# Patient Record
Sex: MALE | Race: BLACK OR AFRICAN AMERICAN | Employment: UNEMPLOYED | URBAN - METROPOLITAN AREA
[De-identification: names, ages, dates, MRNs, and addresses within clinical notes are randomized per-mention and may not be internally consistent; named-entity substitution may affect disease eponyms.]

---

## 2021-07-14 LAB
HBA1C MFR BLD HPLC: 5.1 %
LDL-C, EXTERNAL: 58
TOTAL CHOLESTEROL, NCHOLT: 138

## 2022-06-23 ENCOUNTER — HOSPITAL ENCOUNTER (OUTPATIENT)
Dept: PHYSICAL THERAPY | Age: 57
Discharge: HOME | End: 2022-06-23
Payer: COMMERCIAL

## 2022-06-23 PROCEDURE — 97162 PT EVAL MOD COMPLEX 30 MIN: CPT

## 2022-06-23 NOTE — PROGRESS NOTES
[address] [address] [facility] [address] [facility], [address] [place] [address]  Ph: [phone]    Fax: [phone]    Initial Evaluation/Plan of Care/Statement of Necessity for Physical Therapy Services     Patient name: Alannah Rincon    Date/Start of Care:2022  : 1965  [x]  Patient  Verified  Provider#: [id number]          Referral source: Clemente Callejas MD    Return visit to MD: 22   Medical/Treatment Diagnosis: Left shoulder pain [M25.512]  Fracture of left shoulder girdle, part unspecified, subsequent encounter for fracture with routine healing [S42.92XD]  Encounter for other orthopedic aftercare [Z47.89]    Payor: [address] [place], [place] [address] / Plan: [place] / Product Type: Managed Care Medicaid /       Prior Hospitalization: see medical history     Comorbidities: see intake summary   Prior Level of Function: Indep with ADLs/IADLs   Medications: Verified on Patient Summary List          Patient / Family readiness to learn indicated by: asking questions, trying to perform skills and interest  Persons(s) to be included in education: patient (P); fiance   Barriers to Learning/Limitations: None  Patient Self Reported Health Status: fair  Rehabilitation Potential: good  Previous Treatment/Compliance: none   PMHx/Surgical Hx: see intake summary   Work Hx: not working   Living Situation: Lives with ngoc in a one story home, [address] with railing   Barriers to progress: none   Motivation: good   Substance use: smoker   Cognition: A & O x 3 (unsure of details surrounding injury)   Onset Date: pt states 22 but fiance states it was end of April. Script indicates 22  SUBJECTIVE  Mechanism of Injury/History of Complaint: s/p L shoulder fx following a seizure that occurred in the middle of the night. Pt states all he remembers is waking up in the hospital.  He ended up having surgery on the L shoulder.   Referral states proximal humerus fracture repair. Pt states he was initially in a sling but not anymore. Pt reporting constant pain. Difficulty with showering and dressing due difficulty with lifting the arm up. Also feels like the L hand is weak.    - pt is R hand dominant   Area of pain:  Throughout the upper arm from elbow to top of shoulder    Pain Descriptors:  Aching, throbbing, sharp pain    Pain Level (0-10 scale)  At rest: 4 With activity: 10  Worst: 10   Least: 4  Things that worsen pain: lifting the arm up, using the L arm   Things that ease pain: sleeping  Objective/Functional Measures including ROM/MMT:   Physical Findings   Ortho:   Posture:  Rounded shoulders, scapula winging (L>R); slumped sitting posture   Palpation: TTP throughout the L UT, andres-scapula muscles, distal end of scar   Gross findings:  Large healed scar anterior shoulder with + scar tissue     Specific joints: *normal values in ()    SHOULDER                                         AROM   PROM            MMT   R L R L R L   Flexion (180) 161   111     Extension (60)         Abduction (180) 142        Adduction (0)         IR (70) 68   45     ER (90) 90   15     Horizontal Abduction (130)         Horizontal Adduction (45)         Additional comments: ER @ 60 deg abd; AROM and MMT not tested     ELBOW                             AROM                             PROM                             MMT   R L R L R L   Flexion (150)  WNL       Extension (0)  WNL       Additional comments: wrist flexion and ext 4+/5       SPINE:   CERVICAL                                                ROM                                  Strength   Flexion  WNL    Extension WNL    Protraction     Retraction        R L R L   Lateral Flexion (45) 32 15     Rotation (90) WNL WNL     Additional comments: pulling pain with R lat flexion    Strength: R 54 lbs, L23 lbs     Special Tests: none  Ampa Score:   39.66%  ASSESSMENT/Changes in Function:   Pt referred to PT services s/p L shoulder sx on 4/30/22. Pt presents with constant L shoulder pain, decrease L shoulder ROM and strength and decrease activity tolerance. Pt will benefit from skilled PT services to assist in pain reduction and help maximize his L UE functional mobility. Problem List/Impairments: pain affecting function, decrease ROM, decrease strength, decrease activity tolerance and decrease flexibility/ joint mobility  Treatment Plan may include any combination of the following: Therapeutic exercise, Therapeutic activities, Neuromuscular re-education, Physical agent/modality, Manual therapy, Patient education and Functional mobility training  Patient/ Caregiver education and instruction: activity modification and exercises  Frequency / Duration: Patient to be seen 2 times per week for 16-24 treatments. Certification Period: 6/23/22 - 9/23/22  Patient Goal (s): full motion in my arm.     [] Met [] Not met [] Partially met   Short Term Goals: To be accomplished in 8-12 treatments. 1.  Indep with a HEP to assist in rehab progression. [] Met [] Not met [] Partially met    2. L shoulder PROM flexion >150 deg for functional L shoulder ROM. [] Met [] Not met [] Partially met    3. L shoulder PROM ABD >120 deg for functional L shoulder ROM. [] Met [] Not met [] Partially met    4. L shoulder PROM ER >60 deg for functional L shoulder ROM. [] Met [] Not met [] Partially met    5. Max L shoulder pain 5/10 for increase activity tolerance. [] Met [] Not met [] Partially met    6. 10-20 pound improvement in L  strength. [] Met [] Not met [] Partially met    Long Term Goals: To be accomplished in 16-24 treatments. 1.  Pt will be able to reach overhead >90 deg with good shoulder mechanics without increase pain. [] Met [] Not met [] Partially met    2. Pt will be able to reach behind his back with minimal difficulty to improve indep with dressing and showering. [] Met [] Not met [] Partially met    3.  Pt will be able to lift 5 lbs in the L hand without difficulty or pain. [] Met [] Not met [] Partially met    4. 10-20% improvement in AM-PAC score as indication of improved functional mobility in the L UE. [] Met [] Not met [] Partially met    TODAY'S TREATMENT: EVALUATION completed   Visit #: 1  In time: 1:50pm Out time: 2:40pm  Total Treatment Time (min): 50   Pain Level (0-10 scale) pre treatment: 4  Pain Level (0-10 scale) post treatment: 2  With   [x] TE   [] TA   [] Neuro   [] SC   [] other: Patient Education: [x] Review HEP  Handout provided   [] Progressed/Changed HEP based on:   [] positioning   [] body mechanics   [] transfers   [x] heat/ice application    [x] other: discussed POC        [x]  Plan of care has been reviewed with PTA. The Plan of Care is based on information from the initial evaluation. Yi Barkley, PT, DPT    6/23/2022   ________________________________________________________________________    I certify that the above Therapy Services are being furnished while the patient is under my care. I agree with the treatment plan and certify that this therapy is necessary.     Physician's Signature:_________________________________________________  Date:____________Time: ____________     Azra Briones MD

## 2022-07-13 ENCOUNTER — HOSPITAL ENCOUNTER (OUTPATIENT)
Dept: PHYSICAL THERAPY | Age: 57
Discharge: HOME | End: 2022-07-13
Payer: COMMERCIAL

## 2022-07-13 PROCEDURE — 97110 THERAPEUTIC EXERCISES: CPT

## 2022-07-13 NOTE — PROGRESS NOTES
PT DAILY TREATMENT NOTE  [facility]     Patient Name: Dakota Gregg  Date:2022  : 1965  [x]  Patient  Verified  Payor: [address] [place] [address] Road / Plan: [address] / Product Type: Managed Care Medicaid /    Treatment Area: Left shoulder pain [M25.512]  Fracture of left shoulder girdle, part unspecified, subsequent encounter for fracture with routine healing [S42.92XD]  Encounter for other orthopedic aftercare [Z47.89]       Next MD APPT:   In time:01:45 pm  Out time:02:30 pm  Total Treatment Time (min): 45  Visit #:     SUBJECTIVE  Pain Level (0-10 scale) pre treatment: 3/10  Pain Level (0-10 scale) post treatment: 1-2/10    Any medication changes, allergies to medications, adverse drug reactions, diagnosis change, or new procedure performed?:   [x] No    [] Yes (see summary sheet for update)    Subjective functional status/changes:   [] No changes reported  Patient stated he can tell the difference since doing the exercises. OBJECTIVE      45 min Therapeutic Exercise:  [] See flow sheet :   Rationale: increase ROM and increase strength to improve the patients ability to improve functional mobility in the L UE without pain. min Manual Therapy:    Rationale: decrease pain, increase ROM, decrease trigger points and increase postural awareness to improve the patients ability to  improve functional mobility in the L UE without pain. With   [x] TE   [] TA   [] Neuro   [] SC   [] other: Patient Education: [x] Review HEP    [] Progressed/Changed HEP based on:   [] positioning   [] body mechanics   [] transfers   [] Use of heat/ice     [x] other:         Other Objective/Functional Measures: initiated exercises   Added bike, pulleys , wheel and wand exercises  Reviewed table stretches for HEP    ASSESSMENT/Changes in Function:   Patient tolerated exercises well with no increase in pain or discomfort. He required min cues with exercises and is compliant with HEP.  Patient very tight in ER range . Decrease in pain following treatment session today. Patient will continue to benefit from skilled PT services to address ROM deficits, address strength deficits and analyze and address soft tissue restrictions to attain remaining goals. GOALS/Progress towards goals:  Patient Goal (s): full motion in my arm.     []? Met []? Not met []? Partially met   Short Term Goals: To be accomplished in 8-12 treatments. 1.  Indep with a HEP to assist in rehab progression. [x]? Met []? Not met []? Partially met  7/13  2. L shoulder PROM flexion >150 deg for functional L shoulder ROM. []? Met []? Not met []? Partially met    3. L shoulder PROM ABD >120 deg for functional L shoulder ROM. []? Met []? Not met []? Partially met    4. L shoulder PROM ER >60 deg for functional L shoulder ROM. []? Met []? Not met []? Partially met    5. Max L shoulder pain 5/10 for increase activity tolerance. []? Met []? Not met []? Partially met    6. 10-20 pound improvement in L  strength. []? Met []? Not met []? Partially met      Long Term Goals: To be accomplished in 16-24 treatments. 1.  Pt will be able to reach overhead >90 deg with good shoulder mechanics without increase pain. []? Met []? Not met []? Partially met    2. Pt will be able to reach behind his back with minimal difficulty to improve indep with dressing and showering. []? Met []? Not met []? Partially met    3. Pt will be able to lift 5 lbs in the L hand without difficulty or pain. []? Met []? Not met []? Partially met    4. 10-20% improvement in AM-PAC score as indication of improved functional mobility in the L UE. []? Met []? Not met []?  Partially met    Treatment Plan may include any combination of the following: Therapeutic exercise, Therapeutic activities, Neuromuscular re-education, Physical agent/modality, Manual therapy, Patient education and Functional mobility training    Frequency / Duration: Patient to be seen 2 times per week for 16-24 treatments.     Certification Period: 6/23/22 - 9/23/22        PLAN  [x]  Continue plan of care  [x]  Upgrade activities as tolerated      [x]  Update interventions per flow sheet       []  Discharge due to:  []  Other:    Nazario Webb PTA 7/13/2022

## 2022-07-22 ENCOUNTER — HOSPITAL ENCOUNTER (EMERGENCY)
Age: 57
Discharge: STILL ADMITTED OR ADMINISTRATIVE | End: 2022-07-22

## 2022-07-22 ENCOUNTER — TRANSCRIBE ORDER (OUTPATIENT)
Dept: SCHEDULING | Age: 57
End: 2022-07-22

## 2022-07-22 VITALS
DIASTOLIC BLOOD PRESSURE: 89 MMHG | HEIGHT: 71 IN | WEIGHT: 150 LBS | BODY MASS INDEX: 21 KG/M2 | HEART RATE: 129 BPM | TEMPERATURE: 98.5 F | OXYGEN SATURATION: 100 % | SYSTOLIC BLOOD PRESSURE: 156 MMHG | RESPIRATION RATE: 18 BRPM

## 2022-07-22 DIAGNOSIS — S42.309A: Primary | ICD-10-CM

## 2022-07-22 NOTE — ED TRIAGE NOTES
Pt was sent by orthopedics for CT scan. Has order for CT upper extremity. States he injured L shoulder d/t increased exercise. Recent fracture in shoulder in May.

## 2022-07-22 NOTE — Clinical Note
Patient left without being seen after triage. Patient was called for a room three times with no response. - Pt came to get an ordered CT scan and had no appointment. His order was for [facility] and their CT was down. They went to [facility] ED and they  were swamped with patients so they then came here. Unfortunately, they were signed up for an ED visit. Upon further investigation, the patient did not want an ED visit. Taapn Weller was evaluating the patient when this came to light. She would li ke the visit to not be charged. She contacted central scheduling and was unable to get the appointment changed to here and scheduling is going to attempt to expeditite the appointment and call the patient with the appointment time.

## 2022-07-28 ENCOUNTER — HOSPITAL ENCOUNTER (EMERGENCY)
Age: 57
Discharge: STILL ADMITTED OR ADMINISTRATIVE | End: 2022-07-28

## 2022-10-12 NOTE — PROGRESS NOTES
[address] [facility]  [facility] [address] [facility], [address] [place] [address]  Ph: [phone]  Fax: [phone]    Medicaid Discharge Summary 215    Patient name: Bella Woodall  : 1965  Provider#: [id number]  Referral source: Mckenzie Major MD      Medical/Treatment Diagnosis: Left shoulder pain [M25.512]  Fracture of left shoulder girdle, part unspecified, subsequent encounter for fracture with routine healing [S42.92XD]  Encounter for other orthopedic aftercare [Z47.89]     Prior Hospitalization: see medical history     Comorbidities: See Plan of Care  Prior Level of Function: See Plan of Care  Medications: Verified on Patient Summary List  Start of Care: 22   Onset Date: (see initial plan of care)  Visits from Start of Care: 2  Missed Visits: -  Certification Period : 22 to 22  Assessment/Summary of care/GOALS:   Patient did not return for follow up visits or respond to our calls. The last treatment was on 22, and so the patient will be discharged at this time. Goals were not re-assessed but pt was reporting minimal L shoulder pain at that time. Thank you for the referral.   Ampac Score:  NA    RECOMMENDATIONS:  [x]Discontinue therapy:   []Patient has reached or is progressing toward set goals and is independent with HEP   [x]Patient is non-compliant or has abdicated   []Due to lack of appreciable progress towards set goals   []Patient was hospitalized or suffered illness that impacted ability to continue therapy   []Other:  Arlene Bonds, PT, DPT 10/12/2022   Retain this original for your records. If you are unable to process this request in 24 hours, please contact our office.   ________________________________________________________________________  NOTE TO PHYSICIAN:  Please complete the following and FAX to:   [address] [facility]:  Fax: [phone]   ____ I have read the above report and request that my patient be discharged from therapy.     Physician's Signature:_____________________________________________________ Date:_____________Time:_____________     Erika Velasco MD

## 2022-10-29 PROBLEM — F19.10 POLYSUBSTANCE ABUSE (HCC): Status: ACTIVE | Noted: 2022-06-02

## 2022-10-29 PROBLEM — G40.909 SEIZURE DISORDER (HCC): Status: ACTIVE | Noted: 2022-06-02

## 2022-10-29 PROBLEM — S42.92XD: Status: ACTIVE | Noted: 2022-06-02

## 2022-10-29 PROBLEM — F10.10 ALCOHOL ABUSE: Status: ACTIVE | Noted: 2022-06-02

## 2022-10-29 RX ORDER — BISMUTH SUBSALICYLATE 262 MG
1 TABLET,CHEWABLE ORAL DAILY
COMMUNITY
Start: 2022-10-23 | End: 2022-11-16 | Stop reason: SDUPTHER

## 2022-10-29 RX ORDER — LEVETIRACETAM 1000 MG/1
1000 TABLET ORAL 2 TIMES DAILY
COMMUNITY
Start: 2022-09-12

## 2022-10-29 RX ORDER — LABETALOL 200 MG/1
200 TABLET, FILM COATED ORAL 2 TIMES DAILY
COMMUNITY
Start: 2022-10-23 | End: 2022-11-16 | Stop reason: SDUPTHER

## 2022-10-29 RX ORDER — PRAMIPEXOLE DIHYDROCHLORIDE 0.5 MG/1
1 TABLET ORAL
COMMUNITY
Start: 2022-10-23

## 2022-10-29 RX ORDER — DULOXETIN HYDROCHLORIDE 30 MG/1
1 CAPSULE, DELAYED RELEASE ORAL
COMMUNITY
Start: 2022-10-23 | End: 2022-11-16 | Stop reason: SDUPTHER

## 2022-10-29 RX ORDER — FOLIC ACID 1 MG/1
1 TABLET ORAL DAILY
COMMUNITY
Start: 2022-10-23 | End: 2022-11-16 | Stop reason: SDUPTHER

## 2022-11-14 ENCOUNTER — TRANSCRIBE ORDER (OUTPATIENT)
Dept: EMERGENCY DEPT | Age: 57
End: 2022-11-14

## 2022-11-14 ENCOUNTER — HOSPITAL ENCOUNTER (OUTPATIENT)
Dept: CT IMAGING | Age: 57
Discharge: HOME | End: 2022-11-14
Payer: COMMERCIAL

## 2022-11-14 DIAGNOSIS — R10.84 ABDOMINAL PAIN, GENERALIZED: ICD-10-CM

## 2022-11-14 DIAGNOSIS — R10.84 ABDOMINAL PAIN, GENERALIZED: Primary | ICD-10-CM

## 2022-11-14 PROCEDURE — 74176 CT ABD & PELVIS W/O CONTRAST: CPT

## 2022-11-16 ENCOUNTER — OFFICE VISIT (OUTPATIENT)
Dept: INTERNAL MEDICINE CLINIC | Age: 57
End: 2022-11-16
Payer: COMMERCIAL

## 2022-11-16 VITALS
HEIGHT: 71 IN | OXYGEN SATURATION: 98 % | TEMPERATURE: 96.9 F | BODY MASS INDEX: 20.64 KG/M2 | RESPIRATION RATE: 18 BRPM | WEIGHT: 147.4 LBS | SYSTOLIC BLOOD PRESSURE: 134 MMHG | DIASTOLIC BLOOD PRESSURE: 80 MMHG | HEART RATE: 87 BPM

## 2022-11-16 DIAGNOSIS — Z12.11 COLON CANCER SCREENING: ICD-10-CM

## 2022-11-16 DIAGNOSIS — K86.81 EXOCRINE PANCREATIC INSUFFICIENCY: Primary | ICD-10-CM

## 2022-11-16 DIAGNOSIS — I10 PRIMARY HYPERTENSION: ICD-10-CM

## 2022-11-16 DIAGNOSIS — R22.32 LOCALIZED SWELLING ON LEFT HAND: ICD-10-CM

## 2022-11-16 DIAGNOSIS — S42.92XD SHOULDER FRACTURE, LEFT, WITH ROUTINE HEALING, SUBSEQUENT ENCOUNTER: ICD-10-CM

## 2022-11-16 DIAGNOSIS — Z78.9 ALCOHOL USE: ICD-10-CM

## 2022-11-16 DIAGNOSIS — M48.00 CENTRAL STENOSIS OF SPINAL CANAL: ICD-10-CM

## 2022-11-16 DIAGNOSIS — G40.909 SEIZURE DISORDER (HCC): ICD-10-CM

## 2022-11-16 PROCEDURE — 3079F DIAST BP 80-89 MM HG: CPT | Performed by: INTERNAL MEDICINE

## 2022-11-16 PROCEDURE — 99204 OFFICE O/P NEW MOD 45 MIN: CPT | Performed by: INTERNAL MEDICINE

## 2022-11-16 PROCEDURE — 3075F SYST BP GE 130 - 139MM HG: CPT | Performed by: INTERNAL MEDICINE

## 2022-11-16 RX ORDER — FOLIC ACID 1 MG/1
1 TABLET ORAL DAILY
Qty: 90 TABLET | Refills: 1 | Status: SHIPPED | OUTPATIENT
Start: 2022-11-16

## 2022-11-16 RX ORDER — BISMUTH SUBSALICYLATE 262 MG
1 TABLET,CHEWABLE ORAL DAILY
Qty: 90 TABLET | Refills: 1 | Status: SHIPPED | OUTPATIENT
Start: 2022-11-16

## 2022-11-16 RX ORDER — PANCRELIPASE 60000; 12000; 38000 [USP'U]/1; [USP'U]/1; [USP'U]/1
1 CAPSULE, DELAYED RELEASE PELLETS ORAL
Qty: 90 CAPSULE | Refills: 3 | Status: SHIPPED | OUTPATIENT
Start: 2022-11-16

## 2022-11-16 RX ORDER — LABETALOL 200 MG/1
200 TABLET, FILM COATED ORAL 2 TIMES DAILY
Qty: 90 TABLET | Refills: 1 | Status: SHIPPED | OUTPATIENT
Start: 2022-11-16

## 2022-11-16 RX ORDER — DULOXETIN HYDROCHLORIDE 30 MG/1
30 CAPSULE, DELAYED RELEASE ORAL
Qty: 90 CAPSULE | Refills: 1 | Status: SHIPPED | OUTPATIENT
Start: 2022-11-16

## 2022-11-16 NOTE — PROGRESS NOTES
[address] [facility]  [address]  [place], [address] [facility]  Phone: [phone]      Ayla Cruz (: 1965) is a 56 y.o. male, established patient, here for evaluation of the following chief complaint(s):  Follow Up Chronic Condition, Seizure, Labs, and Follow-up (Follow-up of CT)         SUBJECTIVE/OBJECTIVE:  HPI:  Pt is here to follow up of stool test and CT abdomen. and seizures. Patient stated he did a stool test done at [facility] and he had a CT abdomen at [facility]. He stated his diarrhea comes and goes. Patient stated he never had a colonoscopy. Patient is with his Edwin Renteria and she stated he had 2 seizures last month. Patient stated he has left hand swelling when he hit his hand during his seizure. He stated he is taking seizure medicine. Telicia  informed that he is drinking alcohol every day. Patient stated he has follow-up with Dr Karen Johnson  tomorrow for the left shoulder fracture. He stated pain is better and can lift the left arm up to the shoulder. Patient stated he needs refill for for his blood pressure medication and vitamins. Prior to Admission medications    Medication Sig Start Date End Date Taking? Authorizing Provider   lipase-protease-amylase (Creon) 12,000-38,000 -60,000 unit capsule Take 1 Capsule by mouth three (3) times daily (with meals). 22  Yes Fany Mcfarlane MD   multivitamin (ONE A DAY) tablet Take 1 Tablet by mouth daily. 22  Yes Fany Mcfarlane MD   labetaloL (NORMODYNE) 200 mg tablet Take 1 Tablet by mouth two (2) times a day. 22  Yes Fany Mcfarlane MD   folic acid (FOLVITE) 1 mg tablet Take 1 Tablet by mouth daily. 22  Yes Fany Mcfarlaen MD   DULoxetine (CYMBALTA) 30 mg capsule Take 1 Capsule by mouth nightly. 22  Yes Fany Mcfarlane MD   levETIRAcetam 1,000 mg tablet Take 1,000 mg by mouth two (2) times a day.  22  Yes Provider, Historical   pramipexole (MIRAPEX) 0.5 mg tablet Take 1 Tablet by mouth nightly. 90 minutes before bed 10/23/22  Yes Provider, Historical        No Known Allergies     Past Medical History:   Diagnosis Date    Alcohol abuse     COPD (chronic obstructive pulmonary disease) ([facility])     Fracture of bone of left shoulder     Polysubstance abuse ([facility])     Seizures ([facility])         Family History   Problem Relation Age of Onset    Emphysema Mother         Past Surgical History:   Procedure Laterality Date    HX ORTHOPAEDIC         Review of Systems   Constitutional:  Negative for chills and fever. HENT:  Negative for congestion, ear pain, nosebleeds, sinus pain, sore throat and tinnitus. Eyes:  Negative for redness. Respiratory:  Negative for cough and shortness of breath. Cardiovascular:  Negative for chest pain and palpitations. Gastrointestinal:  Positive for diarrhea. Negative for abdominal pain, nausea and vomiting. Endocrine: Negative for cold intolerance and polyuria. Genitourinary:  Negative for dysuria and hematuria. Musculoskeletal:  Negative for back pain and neck pain. Skin:  Negative for rash. Neurological:  Positive for seizures. Negative for dizziness and headaches. Psychiatric/Behavioral: Negative. /80 (BP 1 Location: Left upper arm, BP Patient Position: Sitting, BP Cuff Size: Adult)   Pulse 87   Temp 96.9 °F (36.1 °C) (Temporal)   Resp 18   Ht 5' 11\" (1.803 m)   Wt 147 lb 6.4 oz (66.9 kg)   SpO2 98%   BMI 20.56 kg/m²      Physical Exam  Constitutional:       General: He is not in acute distress. Appearance: Normal appearance. Comments: Patient is with his fiancée Telicia   HENT:      Head: Normocephalic and atraumatic. Right Ear: External ear normal.      Left Ear: External ear normal.      Nose: Nose normal.      Mouth/Throat:      Mouth: Mucous membranes are moist.   Eyes:      Extraocular Movements: Extraocular movements intact. Pupils: Pupils are equal, round, and reactive to light. Cardiovascular:      Rate and Rhythm: Normal rate and regular rhythm. Pulmonary:      Effort: Pulmonary effort is normal.      Breath sounds: Normal breath sounds. Abdominal:      General: Bowel sounds are normal.      Palpations: Abdomen is soft. Tenderness: There is no abdominal tenderness. Musculoskeletal:         General: Normal range of motion. Cervical back: Neck supple. Comments: Swelling of the dorsum of the left hand, he can lift to the left arm up to the shoulder   Skin:     General: Skin is warm and dry. Neurological:      General: No focal deficit present. Mental Status: He is alert and oriented to person, place, and time. Psychiatric:         Mood and Affect: Mood normal.       ASSESSMENT/PLAN:  Below is the assessment and plan developed based on review of pertinent history, physical exam, labs, studies, and medications. 1. Exocrine pancreatic insufficiency  Comments:  Stool H. pylori is negative, pancreatic elastase is less than 50. Will start Creon with meals  Orders:  -     TSH 3RD GENERATION  -     LIPID PANEL  -     METABOLIC PANEL, COMPREHENSIVE  -     CBC WITH AUTOMATED DIFF  2. Seizure disorder ([facility] [address]  Comments:  Patient is on Keppra and he stated he is taking Keppra daily. Most probably his seizure is related to his alcohol use. Advised to avoid alcohol and to follow-  Orders:  -     REFERRAL TO NEUROLOGY  3. Central stenosis of spinal canal  Comments:  CT abdomen reviewed with the patient has some moderate to severe spinal canal stenosis, will get consult with , advised to continue Cymbalta  Orders:  -     REFERRAL TO SPINE SURGERY  -     DULoxetine (CYMBALTA) 30 mg capsule; Take 1 Capsule by mouth nightly., Normal, Disp-90 Capsule, R-1  4. Alcohol use  Comments:  Counseled the patient about alcohol use. Orders:  -     LIPID PANEL  -     METABOLIC PANEL, COMPREHENSIVE  -     CBC WITH AUTOMATED DIFF  -     folic acid (FOLVITE) 1 mg tablet;  Take 1 Tablet by mouth daily. , Normal, Disp-90 Tablet, R-1  5. Shoulder fracture, left, with routine healing, subsequent encounter  Comments: On supportive treatment, advised to keep  follow-up with Dr. Macel Lefort  6. Localized swelling on left hand  Comments:  Patient had a fall with his recent seizure, has appointment with Dr. Macel Lefort  tomorrow  7. Primary hypertension  Comments:  Blood pressure is controlled advised to continue current medications and low-sodium diet  Orders:  -     labetaloL (NORMODYNE) 200 mg tablet; Take 1 Tablet by mouth two (2) times a day., Normal, Disp-90 Tablet, R-1  8. Colon cancer screening  Comments: Will arrange colonoscopy with Dr. Milton Crawley and information given to patient  Orders:  -     REFERRAL TO GASTROENTEROLOGY    Return in about 4 months (around 3/16/2023), or if symptoms worsen or fail to improve. There are no Patient Instructions on file for this visit. Health Maintenance Due   Topic Date Due    Hepatitis C Screening  Never done    Depression Screen  Never done    Pneumococcal 0-64 years (1 - PCV) Never done    DTaP/Tdap/Td series (1 - Tdap) Never done    Colorectal Cancer Screening Combo  Never done    Shingrix Vaccine Age 50> (1 of 2) Never done    COVID-19 Vaccine (3 - Booster for Moderna series) 07/06/2021    Flu Vaccine (1) Never done        Aspects of this note may have been generated using voice recognition software. Despite editing, there may be unrecognized errors. An electronic signature was used to authenticate this note. -- Travon Robledo MD       Visit Vitals  /80 (BP 1 Location: Left upper arm, BP Patient Position: Sitting, BP Cuff Size: Adult)   Pulse 87   Temp 96.9 °F (36.1 °C) (Temporal)   Resp 18   Ht 5' 11\" (1.803 m)   Wt 147 lb 6.4 oz (66.9 kg)   SpO2 98%   BMI 20.56 kg/m²     1. \"Have you been to the ER, urgent care clinic since your last visit? Hospitalized since your last visit? \" No    2.  \"Have you seen or consulted any other health care providers outside of the [address] [place] [address] since your last visit? \" Yes When: August 2022  Where: Dr. Perry Cheung Reason for visit: follow up seizures and neuropathy       Orthopedic surgeon was last seen 3 weeks ago- Dr. Bi Miller    3. For patients aged 39-70: Has the patient had a colonoscopy / FIT/ Cologuard? No      If the patient is female:    4. For patients aged 41-77: Has the patient had a mammogram within the past 2 years? NA - based on age or sex      11. For patients aged 21-65: Has the patient had a pap smear?  NA - based on age or sex

## 2023-01-23 ENCOUNTER — TELEPHONE (OUTPATIENT)
Dept: INTERNAL MEDICINE CLINIC | Age: 58
End: 2023-01-23

## 2023-01-23 NOTE — TELEPHONE ENCOUNTER
----- Message from Jared Hoff MD sent at 1/22/2023 12:40 PM EST -----  Let him know potasium is slightly high at 5.3,to avoid oranges,bananas and try to have a bowel movement every day

## 2023-02-02 ENCOUNTER — OFFICE VISIT (OUTPATIENT)
Dept: INTERNAL MEDICINE CLINIC | Age: 58
End: 2023-02-02
Payer: COMMERCIAL

## 2023-02-02 VITALS
SYSTOLIC BLOOD PRESSURE: 120 MMHG | TEMPERATURE: 97.1 F | WEIGHT: 143 LBS | HEART RATE: 111 BPM | DIASTOLIC BLOOD PRESSURE: 100 MMHG | BODY MASS INDEX: 20.02 KG/M2 | OXYGEN SATURATION: 99 % | HEIGHT: 71 IN

## 2023-02-02 DIAGNOSIS — G40.909 SEIZURE DISORDER (HCC): ICD-10-CM

## 2023-02-02 DIAGNOSIS — J06.9 ACUTE URI: ICD-10-CM

## 2023-02-02 DIAGNOSIS — K86.81 EXOCRINE PANCREATIC INSUFFICIENCY: ICD-10-CM

## 2023-02-02 DIAGNOSIS — D64.9 MILD ANEMIA: Primary | ICD-10-CM

## 2023-02-02 DIAGNOSIS — R10.9 LEFT FLANK PAIN: ICD-10-CM

## 2023-02-02 DIAGNOSIS — I10 PRIMARY HYPERTENSION: ICD-10-CM

## 2023-02-02 DIAGNOSIS — F17.200 TOBACCO USE DISORDER: ICD-10-CM

## 2023-02-02 DIAGNOSIS — M48.00 CENTRAL STENOSIS OF SPINAL CANAL: ICD-10-CM

## 2023-02-02 DIAGNOSIS — B35.1 ONYCHOMYCOSIS: ICD-10-CM

## 2023-02-02 DIAGNOSIS — D72.821 MONOCYTOSIS: ICD-10-CM

## 2023-02-02 DIAGNOSIS — E87.5 HYPERKALEMIA: ICD-10-CM

## 2023-02-02 DIAGNOSIS — E87.1 HYPONATREMIA: ICD-10-CM

## 2023-02-02 PROBLEM — S42.352D CLOSED DISPLACED COMMINUTED FRACTURE OF SHAFT OF LEFT HUMERUS WITH ROUTINE HEALING: Status: ACTIVE | Noted: 2022-07-31

## 2023-02-02 PROBLEM — M83.5: Status: ACTIVE | Noted: 2022-11-17

## 2023-02-02 PROBLEM — S62.347A CLOSED NONDISPLACED FRACTURE OF BASE OF FIFTH METACARPAL BONE OF LEFT HAND: Status: ACTIVE | Noted: 2022-11-17

## 2023-02-02 PROBLEM — S42.92XA: Status: ACTIVE | Noted: 2023-02-02

## 2023-02-02 PROCEDURE — 3074F SYST BP LT 130 MM HG: CPT | Performed by: INTERNAL MEDICINE

## 2023-02-02 PROCEDURE — 3080F DIAST BP >= 90 MM HG: CPT | Performed by: INTERNAL MEDICINE

## 2023-02-02 PROCEDURE — 99214 OFFICE O/P EST MOD 30 MIN: CPT | Performed by: INTERNAL MEDICINE

## 2023-02-02 RX ORDER — LEVETIRACETAM 750 MG/1
1500 TABLET ORAL 2 TIMES DAILY
COMMUNITY
Start: 2023-01-02

## 2023-02-02 RX ORDER — PANCRELIPASE 60000; 12000; 38000 [USP'U]/1; [USP'U]/1; [USP'U]/1
1 CAPSULE, DELAYED RELEASE PELLETS ORAL
Qty: 90 CAPSULE | Refills: 3 | Status: SHIPPED | OUTPATIENT
Start: 2023-02-02

## 2023-02-02 RX ORDER — PRAMIPEXOLE DIHYDROCHLORIDE 1 MG/1
1 TABLET ORAL
COMMUNITY
Start: 2023-01-26

## 2023-02-02 RX ORDER — DULOXETIN HYDROCHLORIDE 60 MG/1
1 CAPSULE, DELAYED RELEASE ORAL
COMMUNITY
Start: 2023-01-26

## 2023-02-02 NOTE — PROGRESS NOTES
[address] [facility]  [address]  [place], [address] [facility]  Phone: [phone]      Gary Benavides (: 1965) is a 66 y.o. male, established patient, here for evaluation of the following chief complaint(s):  Labs, Follow Up Chronic Condition, and Abdominal Pain         SUBJECTIVE/OBJECTIVE:  HPI:  Patient is for follow up, he recently had blood work. He states that he has a pain on left side, lower abdomen,  and lower back pain. He is not sure if he had the Flu, he does not have much energy. Did not do the COVID test.  He states that nothing helps the pain, bowel  movement, food, or fasting. He has  diarrhea but no nausea, vomiting, fever, and chills. He is scheduled for a Colonoscopy on , with Dr. Christie Benson  2023 Hemo 12.1, Hema 36.9, Glu 95, Creat 1.05, BUN/Creat 5, Sod 133, K+ 5.3, Chol T 104, Trig 46, HDL 63, LDL 29, TSH 1.61. 2022 Pan Creat <50, H Pylori Neg. He has Jerral Prima his fiance. He will need refills on his medications. Prior to Admission medications    Medication Sig Start Date End Date Taking? Authorizing Provider   lipase-protease-amylase (Creon) 12,000-38,000 -60,000 unit capsule Take 1 Capsule by mouth three (3) times daily (with meals). 23  Yes Glynn Singletary MD   multivitamin (ONE A DAY) tablet Take 1 Tablet by mouth daily. 22  Yes Glynn Singletary MD   labetaloL (NORMODYNE) 200 mg tablet Take 1 Tablet by mouth two (2) times a day. 22  Yes Glynn Singletary MD   folic acid (FOLVITE) 1 mg tablet Take 1 Tablet by mouth daily. 22  Yes Glynn Singletary MD   DULoxetine (CYMBALTA) 60 mg capsule Take 1 Capsule by mouth nightly. 23   Provider, Historical   levETIRAcetam (KEPPRA) 750 mg tablet Take 1,500 mg by mouth two (2) times a day. 23   Provider, Historical   pramipexole (MIRAPEX) 1 mg tablet Take 1 Tablet by mouth nightly.  23   Provider, Historical        No Known Allergies     Past Medical History:   Diagnosis Date    Alcohol abuse     COPD (chronic obstructive pulmonary disease) ([facility])     Fracture of bone of left shoulder     Polysubstance abuse ([facility])     Seizures ([address]         Family History   Problem Relation Age of Onset    Emphysema Mother         Past Surgical History:   Procedure Laterality Date    HX ORTHOPAEDIC         Review of Systems   Constitutional:  Negative for chills and fever. HENT:  Negative for congestion, ear pain, nosebleeds, sinus pain, sore throat and tinnitus. Eyes:  Negative for redness. Respiratory:  Positive for cough. Negative for shortness of breath. Cardiovascular:  Negative for chest pain and palpitations. Gastrointestinal:  Positive for abdominal pain. Negative for diarrhea, nausea and vomiting. Endocrine: Negative for cold intolerance and polyuria. Genitourinary:  Negative for dysuria and hematuria. Musculoskeletal:  Positive for back pain. Negative for neck pain. Skin:  Negative for rash. Neurological:  Negative for dizziness and headaches. Psychiatric/Behavioral: Negative. BP (!) 120/100 (BP 1 Location: Left upper arm, BP Patient Position: Sitting)   Pulse (!) 111   Temp 97.1 °F (36.2 °C) (Temporal)   Ht 5' 11\" (1.803 m)   Wt 143 lb (64.9 kg)   SpO2 99%   BMI 19.94 kg/m²      Physical Exam  Constitutional:       Appearance: Tall and thin built patient, patient is with his edwige Zacarias   HENT:      Head: Normocephalic and atraumatic. Right Ear: External ear normal.      Left Ear: External ear normal.      Nose: Nose normal.      Mouth/Throat:      Mouth: Mucous membranes are moist.   Eyes:      Extraocular Movements: Extraocular movements intact. Pupils: Pupils are equal, round, and reactive to light. Cardiovascular:      Rate and Rhythm: Normal rate and regular rhythm. Pulmonary:      Effort: Pulmonary effort is normal.      Breath sounds: Normal breath sounds.    Abdominal:      Palpations: Abdomen is soft.      Tenderness: There is no abdominal tenderness. Musculoskeletal:      Cervical back: Normal range of motion and neck supple. Right lower leg: No edema. Left lower leg: No edema. Hallux valgus bilaterally, thick and hypertrophic toenails, crowding of the toes present, dorsalis pedis present bilaterally. Skin:     General: Skin is warm and dry. Neurological:      General: No focal deficit present. Mental Status: He is alert and oriented to person, place, and time. Psychiatric:         Mood and Affect: Mood normal.    ASSESSMENT/PLAN:  Below is the assessment and plan developed based on review of pertinent history, physical exam, labs, studies, and medications. 1. Mild anemia  Comments:  WBC 8.6,HB 12.1,Hct 36.9,awaiting colonosciopy on 2/13/23. Orders:  -     CBC WITH AUTOMATED DIFF; Future  -     IRON PROFILE; Future  2. Central stenosis of spinal canal  Comments:  Advised to continue Cymbalta, awaiting consult with Dr. Blanco Lopez  Orders:  -     Chloé Frias  3. Exocrine pancreatic insufficiency  Comments:  Patient did not get Creon so far, we will resend the prescription, advised to check pharmacy whether it is covered by his insurance  4. Seizure disorder ([facility] [address]  Comments: On Keppra, advised to keep follow-up with Dr. Lang Ventura  5. Monocytosis  Comments: Will monitor. 6. Hyperkalemia  Comments: And slightly high at 5.3  7. Hyponatremia  Comments:  Sodium low at 133, advised to avoid alcohol  Orders:  -     METABOLIC PANEL, COMPREHENSIVE; Future  8. Acute URI  Comments:  Asvised Covid test,saline gargles,saline nasal spray,will try Tessalon perles  9. Left flank pain  Comments:  Advised to keep fluid intake,will check Renal US   Orders:  -     US RETROPERITONEUM COMP; Future  10. Onychomycosis  -     REFERRAL TO ORTHOPEDICS  11. Primary hypertension  Comments:  Systolic blood pressure is fair, diastolic blood pressure is high, advised to continue labetalol  12.  Tobacco use disorder  Comments:  Counseled patient about tobacco use    Return in about 4 months (around 6/2/2023). There are no Patient Instructions on file for this visit. Health Maintenance Due   Topic Date Due    Hepatitis C Screening  Never done    Pneumococcal 0-64 years (1 - PCV) Never done    DTaP/Tdap/Td series (1 - Tdap) Never done    Colorectal Cancer Screening Combo  Never done    Shingles Vaccine (1 of 2) Never done    COVID-19 Vaccine (3 - Booster for Moderna series) 07/06/2021    Flu Vaccine (1) Never done        Aspects of this note may have been generated using voice recognition software. Despite editing, there may be unrecognized errors. An electronic signature was used to authenticate this note.   -- Cris Palomino MD

## 2023-02-02 NOTE — PROGRESS NOTES
Chief Complaint   Patient presents with    Labs    Follow Up Chronic Condition    Abdominal Pain     1. \"Have you been to the ER, urgent care clinic since your last visit? Hospitalized since your last visit? \"  No    2. \"Have you seen or consulted any other health care providers outside of the [address] [place] [address] since your last visit? \"  Dr. Pranay Velazquez      3. For patients aged 39-70: Has the patient had a colonoscopy / FIT/ Cologuard? No      If the patient is female:    4. For patients aged 41-77: Has the patient had a mammogram within the past 2 years? NA - based on age or sex      11. For patients aged 21-65: Has the patient had a pap smear?  NA - based on age or sex

## 2023-02-04 ENCOUNTER — APPOINTMENT (OUTPATIENT)
Dept: GENERAL RADIOLOGY | Age: 58
End: 2023-02-04
Attending: NURSE PRACTITIONER
Payer: MEDICAID

## 2023-02-04 ENCOUNTER — APPOINTMENT (OUTPATIENT)
Dept: CT IMAGING | Age: 58
End: 2023-02-04
Attending: NURSE PRACTITIONER
Payer: MEDICAID

## 2023-02-04 ENCOUNTER — HOSPITAL ENCOUNTER (EMERGENCY)
Age: 58
Discharge: HOME | End: 2023-02-04
Payer: MEDICAID

## 2023-02-04 VITALS
BODY MASS INDEX: 21 KG/M2 | DIASTOLIC BLOOD PRESSURE: 89 MMHG | WEIGHT: 150 LBS | SYSTOLIC BLOOD PRESSURE: 141 MMHG | HEART RATE: 94 BPM | OXYGEN SATURATION: 98 % | RESPIRATION RATE: 18 BRPM | TEMPERATURE: 99.1 F | HEIGHT: 71 IN

## 2023-02-04 DIAGNOSIS — R10.12 ABDOMINAL PAIN, LUQ (LEFT UPPER QUADRANT): Primary | ICD-10-CM

## 2023-02-04 LAB
ALBUMIN SERPL-MCNC: 3.8 G/DL (ref 3.5–5)
ALBUMIN/GLOB SERPL: 0.9 (ref 1.1–2.2)
ALP SERPL-CCNC: 145 U/L (ref 45–117)
ALT SERPL-CCNC: 22 U/L (ref 12–78)
ANION GAP SERPL CALC-SCNC: 11 MMOL/L (ref 5–15)
AST SERPL W P-5'-P-CCNC: 47 U/L (ref 15–37)
BASOPHILS # BLD: 0 K/UL (ref 0–0.1)
BASOPHILS NFR BLD: 0 % (ref 0–1)
BILIRUB SERPL-MCNC: 0.2 MG/DL (ref 0.2–1)
BUN SERPL-MCNC: 10 MG/DL (ref 6–20)
BUN/CREAT SERPL: 9 (ref 12–20)
CA-I BLD-MCNC: 9.3 MG/DL (ref 8.5–10.1)
CHLORIDE SERPL-SCNC: 103 MMOL/L (ref 97–108)
CO2 SERPL-SCNC: 25 MMOL/L (ref 21–32)
CREAT SERPL-MCNC: 1.15 MG/DL (ref 0.7–1.3)
DIFFERENTIAL METHOD BLD: ABNORMAL
EOSINOPHIL # BLD: 0.2 K/UL (ref 0–0.4)
EOSINOPHIL NFR BLD: 3 % (ref 0–7)
ERYTHROCYTE [DISTWIDTH] IN BLOOD BY AUTOMATED COUNT: 18.5 % (ref 11.5–14.5)
GLOBULIN SER CALC-MCNC: 4.3 G/DL (ref 2–4)
GLUCOSE SERPL-MCNC: 92 MG/DL (ref 65–100)
HCT VFR BLD AUTO: 36.3 % (ref 36.6–50.3)
HGB BLD-MCNC: 12.2 G/DL (ref 12.1–17)
IMM GRANULOCYTES # BLD AUTO: 0 K/UL (ref 0–0.04)
IMM GRANULOCYTES NFR BLD AUTO: 0 % (ref 0–0.5)
LIPASE SERPL-CCNC: 102 U/L (ref 73–393)
LYMPHOCYTES # BLD: 2.2 K/UL (ref 0.8–3.5)
LYMPHOCYTES NFR BLD: 34 % (ref 12–49)
MCH RBC QN AUTO: 29.8 PG (ref 26–34)
MCHC RBC AUTO-ENTMCNC: 33.6 G/DL (ref 30–36.5)
MCV RBC AUTO: 88.8 FL (ref 80–99)
MONOCYTES # BLD: 0.5 K/UL (ref 0–1)
MONOCYTES NFR BLD: 8 % (ref 5–13)
NEUTS SEG # BLD: 3.5 K/UL (ref 1.8–8)
NEUTS SEG NFR BLD: 55 % (ref 32–75)
PLATELET # BLD AUTO: 153 K/UL (ref 150–400)
PMV BLD AUTO: 9.6 FL (ref 8.9–12.9)
POTASSIUM SERPL-SCNC: 4.2 MMOL/L (ref 3.5–5.1)
PROT SERPL-MCNC: 8.1 G/DL (ref 6.4–8.2)
RBC # BLD AUTO: 4.09 M/UL (ref 4.1–5.7)
SODIUM SERPL-SCNC: 139 MMOL/L (ref 136–145)
WBC # BLD AUTO: 6.4 K/UL (ref 4.1–11.1)

## 2023-02-04 PROCEDURE — 99285 EMERGENCY DEPT VISIT HI MDM: CPT

## 2023-02-04 PROCEDURE — 36415 COLL VENOUS BLD VENIPUNCTURE: CPT

## 2023-02-04 PROCEDURE — 83690 ASSAY OF LIPASE: CPT

## 2023-02-04 PROCEDURE — 96374 THER/PROPH/DIAG INJ IV PUSH: CPT

## 2023-02-04 PROCEDURE — 80053 COMPREHEN METABOLIC PANEL: CPT

## 2023-02-04 PROCEDURE — 96375 TX/PRO/DX INJ NEW DRUG ADDON: CPT

## 2023-02-04 PROCEDURE — 93005 ELECTROCARDIOGRAM TRACING: CPT

## 2023-02-04 PROCEDURE — 74011250636 HC RX REV CODE- 250/636: Performed by: NURSE PRACTITIONER

## 2023-02-04 PROCEDURE — 74176 CT ABD & PELVIS W/O CONTRAST: CPT

## 2023-02-04 PROCEDURE — 71046 X-RAY EXAM CHEST 2 VIEWS: CPT

## 2023-02-04 PROCEDURE — 85025 COMPLETE CBC W/AUTO DIFF WBC: CPT

## 2023-02-04 RX ORDER — ONDANSETRON 2 MG/ML
4 INJECTION INTRAMUSCULAR; INTRAVENOUS
Status: COMPLETED | OUTPATIENT
Start: 2023-02-04 | End: 2023-02-04

## 2023-02-04 RX ORDER — KETOROLAC TROMETHAMINE 30 MG/ML
15 INJECTION, SOLUTION INTRAMUSCULAR; INTRAVENOUS
Status: COMPLETED | OUTPATIENT
Start: 2023-02-04 | End: 2023-02-04

## 2023-02-04 RX ORDER — METHOCARBAMOL 500 MG/1
500 TABLET, FILM COATED ORAL
Qty: 10 TABLET | Refills: 0 | Status: SHIPPED | OUTPATIENT
Start: 2023-02-04

## 2023-02-04 RX ORDER — NAPROXEN 500 MG/1
500 TABLET ORAL 2 TIMES DAILY WITH MEALS
Qty: 20 TABLET | Refills: 0 | Status: SHIPPED | OUTPATIENT
Start: 2023-02-04 | End: 2023-02-14

## 2023-02-04 RX ADMIN — ONDANSETRON 4 MG: 2 INJECTION INTRAMUSCULAR; INTRAVENOUS at 11:39

## 2023-02-04 RX ADMIN — KETOROLAC TROMETHAMINE 15 MG: 30 INJECTION, SOLUTION INTRAMUSCULAR; INTRAVENOUS at 11:39

## 2023-02-04 NOTE — DISCHARGE INSTRUCTIONS
Thank you! Thank you for allowing me to care for you in the emergency department. It is my goal to provide you with excellent care. If you have not received excellent quality care, please ask to speak to the nurse manager. Please fill out the survey that will come to you by mail or email since we listen to your feedback! Below you will find a list of your tests from today's visit. Should you have any questions, please do not hesitate to call the emergency department. Labs  Recent Results (from the past 12 hour(s))   CBC WITH AUTOMATED DIFF    Collection Time: 02/04/23 11:35 AM   Result Value Ref Range    WBC 6.4 4.1 - 11.1 K/uL    RBC 4.09 (L) 4.10 - 5.70 M/uL    HGB 12.2 12.1 - 17.0 g/dL    HCT 36.3 (L) 36.6 - 50.3 %    MCV 88.8 80.0 - 99.0 FL    MCH 29.8 26.0 - 34.0 PG    MCHC 33.6 30.0 - 36.5 g/dL    RDW 18.5 (H) 11.5 - 14.5 %    PLATELET 453 895 - 125 K/uL    MPV 9.6 8.9 - 12.9 FL    NEUTROPHILS 55 32 - 75 %    LYMPHOCYTES 34 12 - 49 %    MONOCYTES 8 5 - 13 %    EOSINOPHILS 3 0 - 7 %    BASOPHILS 0 0 - 1 %    IMMATURE GRANULOCYTES 0 0.0 - 0.5 %    ABS. NEUTROPHILS 3.5 1.8 - 8.0 K/UL    ABS. LYMPHOCYTES 2.2 0.8 - 3.5 K/UL    ABS. MONOCYTES 0.5 0.0 - 1.0 K/UL    ABS. EOSINOPHILS 0.2 0.0 - 0.4 K/UL    ABS. BASOPHILS 0.0 0.0 - 0.1 K/UL    ABS. IMM. GRANS. 0.0 0.00 - 0.04 K/UL    DF AUTOMATED     METABOLIC PANEL, COMPREHENSIVE    Collection Time: 02/04/23 11:35 AM   Result Value Ref Range    Sodium 139 136 - 145 mmol/L    Potassium 4.2 3.5 - 5.1 mmol/L    Chloride 103 97 - 108 mmol/L    CO2 25 21 - 32 mmol/L    Anion gap 11 5 - 15 mmol/L    Glucose 92 65 - 100 mg/dL    BUN 10 6 - 20 mg/dL    Creatinine 1.15 0.70 - 1.30 mg/dL    BUN/Creatinine ratio 9 (L) 12 - 20      eGFR >60 >60 ml/min/1.73m2    Calcium 9.3 8.5 - 10.1 mg/dL    Bilirubin, total 0.2 0.2 - 1.0 mg/dL    AST (SGOT) 47 (H) 15 - 37 U/L    ALT (SGPT) 22 12 - 78 U/L    Alk.  phosphatase 145 (H) 45 - 117 U/L    Protein, total 8.1 6.4 - 8.2 g/dL Albumin 3.8 3.5 - 5.0 g/dL    Globulin 4.3 (H) 2.0 - 4.0 g/dL    A-G Ratio 0.9 (L) 1.1 - 2.2     LIPASE    Collection Time: 02/04/23 11:35 AM   Result Value Ref Range    Lipase 102 73 - 393 U/L       Radiologic Studies  CT ABD PELV WO CONT   Final Result   No acute intraperitoneal process is identified. Incidental and/or nonemergent findings are as described in detail above. XR CHEST PA LAT   Final Result   No acute cardiopulmonary disease. CT Results  (Last 48 hours)                 02/04/23 1212  CT ABD PELV WO CONT Final result    Impression:  No acute intraperitoneal process is identified. Incidental and/or nonemergent findings are as described in detail above. Narrative:  CLINICAL HISTORY: left upper abdominal and flank pain    INDICATION: left upper abdominal and flank pain   COMPARISON: 11/14/2022   CONTRAST:  None. TECHNIQUE:    Thin axial images were obtained through the abdomen and pelvis. Coronal and   sagittal reformats were generated. Oral contrast was not administered. CT dose   reduction was achieved through use of a standardized protocol tailored for this   examination and automatic exposure control for dose modulation. The absence of intravenous contrast material reduces the sensitivity for   evaluation of visceral organs and vasculature including presence of small mass   lesions, hemodynamically significant stenoses, dissections, mucosal   abnormalities etc.       FINDINGS:    LOWER THORAX: No significant abnormality in the incidentally imaged lower chest.   LIVER/GALLBLADDER: No mass. Gallbladder is within normal limits. CBD is not   dilated. SPLEEN/PANCREAS:  within normal limits. ADRENALS/KIDNEYS: Unremarkable. Nephrocalcinosis/milk of calcium with small   nonobstructive calculi. STOMACH: Unremarkable. SMALL BOWEL/COLON: There is colonic diverticulosis. Fecal stasis. APPENDIX: Unremarkable.    PERITONEUM: No ascites or pneumoperitoneum. RETROPERITONEUM: Aortic atherosclerotic change. REPRODUCTIVE ORGANS: Unremarkable   URINARY BLADDER: No mass or calculus. BONES: Degenerative changes of the lumbar spine. Moderate to severe canal   stenosis at L3-4. Canal stenosis at L4-5 is moderate. Multilevel foraminal   stenosis. ADDITIONAL COMMENTS: N/A                 CXR Results  (Last 48 hours)                 02/04/23 1207  XR CHEST PA LAT Final result    Impression:  No acute cardiopulmonary disease. Narrative: Indication:  cough, left lower lobe pain        Exam: PA and lateral views of the chest.       Findings: Cardiomediastinal silhouette is within normal limits. Lungs are clear   bilaterally. Pleural spaces are normal. Osseous structures are intact.                 ------------------------------------------------------------------------------------------------------------  The exam and treatment you received in the Emergency Department were for an urgent problem and are not intended as complete care. It is important that you follow-up with a doctor, nurse practitioner, or physician assistant to:  (1) confirm your diagnosis,  (2) re-evaluation of changes in your illness and treatment, and  (3) for ongoing care. Please take your discharge instructions with you when you go to your follow-up appointment. If you have any problem arranging a follow-up appointment, contact the Emergency Department. If your symptoms become worse or you do not improve as expected and you are unable to reach your health care provider, please return to the Emergency Department. We are available 24 hours a day. If a prescription has been provided, please have it filled as soon as possible to prevent a delay in treatment. If you have any questions or reservations about taking the medication due to side effects or interactions with other medications, please call your primary care provider or contact the ER.

## 2023-02-04 NOTE — Clinical Note
[facility] [address] [place] [id number]  [phone]    Work/School Note    Date: 2/4/2023    To Whom It May concern:      Santiago Tolentino was seen and treated today in the emergency room by the following provider(s):  Nurse Practitioner: Jose Ramon Corea NP. Santiago Tolentino is excused from work/school on 02/04/23. He is clear to return to work/school on 02/05/23.         Sincerely,          Sirisha Maier NP

## 2023-02-04 NOTE — ED PROVIDER NOTES
[facility] EMERGENCY DEPARTMENT  EMERGENCY DEPARTMENT HISTORY AND PHYSICAL EXAM      Date: 2/4/2023  Patient Name: Farncois Estrada  MRN: [id number]  [place] 1965  Date of evaluation: 2/4/2023  Provider: Malika Kelly NP   Note Started: 11:32 AM 2/4/23    HISTORY OF PRESENT ILLNESS     Chief Complaint   Patient presents with    Generalized Body Aches    Abdominal Pain     LUQ       History Provided By: Patient    HPI: Francois Estrada, 66 y.o. male emphysema, COPD, hypertension, epilepsy, current smoker 5 cigarettes/day and daily drinker 12 pack/week complains of left upper abdominal pain that radiates to the flank and back for the past 3 days. He reports symptoms are intermittent sharp, aching. He reports aggravated with coughing, ambulation, palpation. Occasional nausea and chills. Denies any chest pain, shortness of breath, vomiting, lightheaded, dizziness, urinary frequency urgency burning injury or trauma. Of note patient reports last drink of 2 beers yesterday evening. PAST MEDICAL HISTORY   Past Medical History:  Past Medical History:   Diagnosis Date    Alcohol abuse     COPD (chronic obstructive pulmonary disease) ([address]     Fracture of bone of left shoulder     Hypertension     Polysubstance abuse ([facility]     Seizures ([facility]        Past Surgical History:  Past Surgical History:   Procedure Laterality Date    HX ORTHOPAEDIC         Family History:  Family History   Problem Relation Age of Onset    Emphysema Mother        Social History:  Social History     Tobacco Use    Smoking status: Every Day     Packs/day: 0.25     Years: 40.00     Pack years: 10.00     Types: Cigarettes    Smokeless tobacco: Never   Vaping Use    Vaping Use: Never used   Substance Use Topics    Alcohol use: Yes     Comment: 18    Drug use: Not Currently       Allergies:  No Known Allergies    PCP: Eldon Ohara MD    Current Meds:   Previous Medications    DULOXETINE (CYMBALTA) 60 MG CAPSULE    Take 1 Capsule by mouth nightly. FOLIC ACID (FOLVITE) 1 MG TABLET    Take 1 Tablet by mouth daily. LABETALOL (NORMODYNE) 200 MG TABLET    Take 1 Tablet by mouth two (2) times a day. LEVETIRACETAM (KEPPRA) 750 MG TABLET    Take 1,500 mg by mouth two (2) times a day. LIPASE-PROTEASE-AMYLASE (CREON) 12,000-38,000 -60,000 UNIT CAPSULE    Take 1 Capsule by mouth three (3) times daily (with meals). MULTIVITAMIN (ONE A DAY) TABLET    Take 1 Tablet by mouth daily. PRAMIPEXOLE (MIRAPEX) 1 MG TABLET    Take 1 Tablet by mouth nightly. REVIEW OF SYSTEMS   Review of Systems   Constitutional:  Positive for chills. Negative for fever. HENT:  Negative for congestion, sinus pressure and sinus pain. Respiratory:  Positive for cough. Negative for shortness of breath. Cardiovascular:  Negative for chest pain and leg swelling. Gastrointestinal:  Positive for abdominal pain and nausea. Negative for vomiting. Genitourinary:  Positive for flank pain. Negative for dysuria, frequency and urgency. Musculoskeletal:  Negative for arthralgias and myalgias. Skin: Negative. Neurological:  Negative for dizziness, weakness, light-headedness, numbness and headaches. Psychiatric/Behavioral: Negative. All other systems reviewed and are negative. Positives and Pertinent negatives as per HPI. PHYSICAL EXAM     ED Triage Vitals [02/04/23 1058]   ED Encounter Vitals Group      BP (!) 141/89      Pulse (Heart Rate) 94      Resp Rate 18      Temp 99.1 °F (37.3 °C)      Temp src       O2 Sat (%) 98 %      Weight 150 lb      Height 5' 11\"      Physical Exam  Vitals and nursing note reviewed. Constitutional:       General: He is not in acute distress. Appearance: Normal appearance. He is normal weight. He is not ill-appearing or toxic-appearing. HENT:      Head: Normocephalic and atraumatic.       Right Ear: Hearing normal.      Left Ear: Hearing normal.      Nose: Nose normal.      Mouth/Throat:      Mouth: Mucous membranes are moist. Eyes:      General: Lids are normal.      Extraocular Movements: Extraocular movements intact. Pupils: Pupils are equal, round, and reactive to light. Cardiovascular:      Rate and Rhythm: Normal rate and regular rhythm. Pulses: Normal pulses. Radial pulses are 2+ on the right side and 2+ on the left side. Dorsalis pedis pulses are 2+ on the right side and 2+ on the left side. Pulmonary:      Effort: Pulmonary effort is normal. No accessory muscle usage or respiratory distress. Breath sounds: Normal breath sounds. No wheezing or rhonchi. Abdominal:      General: Bowel sounds are normal.      Palpations: Abdomen is soft. Tenderness: There is abdominal tenderness in the left upper quadrant. There is left CVA tenderness. There is no right CVA tenderness. Musculoskeletal:      Cervical back: Normal range of motion and neck supple. No muscular tenderness. Right lower leg: No edema. Left lower leg: No edema. Feet:      Right foot:      Skin integrity: No skin breakdown. Left foot:      Skin integrity: No skin breakdown. Skin:     General: Skin is warm and dry. Capillary Refill: Capillary refill takes less than 2 seconds. Findings: No abrasion, bruising, ecchymosis, erythema or signs of injury. Neurological:      Mental Status: He is alert and oriented to person, place, and time. GCS: GCS eye subscore is 4. GCS verbal subscore is 5. GCS motor subscore is 6. Sensory: Sensation is intact. Psychiatric:         Attention and Perception: Attention normal.         Mood and Affect: Mood normal.         Behavior: Behavior normal. Behavior is cooperative.          Cognition and Memory: Cognition normal.       SCREENINGS               No data recorded        LAB, EKG AND DIAGNOSTIC RESULTS   Labs:  Recent Results (from the past 12 hour(s))   CBC WITH AUTOMATED DIFF    Collection Time: 02/04/23 11:35 AM   Result Value Ref Range    WBC 6.4 4.1 - 11.1 K/uL    RBC 4.09 (L) 4.10 - 5.70 M/uL    HGB 12.2 12.1 - 17.0 g/dL    HCT 36.3 (L) 36.6 - 50.3 %    MCV 88.8 80.0 - 99.0 FL    MCH 29.8 26.0 - 34.0 PG    MCHC 33.6 30.0 - 36.5 g/dL    RDW 18.5 (H) 11.5 - 14.5 %    PLATELET 162 750 - 929 K/uL    MPV 9.6 8.9 - 12.9 FL    NEUTROPHILS 55 32 - 75 %    LYMPHOCYTES 34 12 - 49 %    MONOCYTES 8 5 - 13 %    EOSINOPHILS 3 0 - 7 %    BASOPHILS 0 0 - 1 %    IMMATURE GRANULOCYTES 0 0.0 - 0.5 %    ABS. NEUTROPHILS 3.5 1.8 - 8.0 K/UL    ABS. LYMPHOCYTES 2.2 0.8 - 3.5 K/UL    ABS. MONOCYTES 0.5 0.0 - 1.0 K/UL    ABS. EOSINOPHILS 0.2 0.0 - 0.4 K/UL    ABS. BASOPHILS 0.0 0.0 - 0.1 K/UL    ABS. IMM. GRANS. 0.0 0.00 - 0.04 K/UL    DF AUTOMATED     METABOLIC PANEL, COMPREHENSIVE    Collection Time: 02/04/23 11:35 AM   Result Value Ref Range    Sodium 139 136 - 145 mmol/L    Potassium 4.2 3.5 - 5.1 mmol/L    Chloride 103 97 - 108 mmol/L    CO2 25 21 - 32 mmol/L    Anion gap 11 5 - 15 mmol/L    Glucose 92 65 - 100 mg/dL    BUN 10 6 - 20 mg/dL    Creatinine 1.15 0.70 - 1.30 mg/dL    BUN/Creatinine ratio 9 (L) 12 - 20      eGFR >60 >60 ml/min/1.73m2    Calcium 9.3 8.5 - 10.1 mg/dL    Bilirubin, total 0.2 0.2 - 1.0 mg/dL    AST (SGOT) 47 (H) 15 - 37 U/L    ALT (SGPT) 22 12 - 78 U/L    Alk. phosphatase 145 (H) 45 - 117 U/L    Protein, total 8.1 6.4 - 8.2 g/dL    Albumin 3.8 3.5 - 5.0 g/dL    Globulin 4.3 (H) 2.0 - 4.0 g/dL    A-G Ratio 0.9 (L) 1.1 - 2.2     LIPASE    Collection Time: 02/04/23 11:35 AM   Result Value Ref Range    Lipase 102 73 - 393 U/L       EKG: Initial EKG NSR @ 79 with artifact      Radiologic Studies:    Interpretation per the Radiologist below, if available at the time of this note:  XR CHEST PA LAT    Result Date: 2/4/2023  Indication:  cough, left lower lobe pain Exam: PA and lateral views of the chest. Findings: Cardiomediastinal silhouette is within normal limits. Lungs are clear bilaterally. Pleural spaces are normal. Osseous structures are intact.      No acute cardiopulmonary disease. CT ABD PELV WO CONT    Result Date: 2/4/2023  CLINICAL HISTORY: left upper abdominal and flank pain INDICATION: left upper abdominal and flank pain COMPARISON: 11/14/2022 CONTRAST:  None. TECHNIQUE: Thin axial images were obtained through the abdomen and pelvis. Coronal and sagittal reformats were generated. Oral contrast was not administered. CT dose reduction was achieved through use of a standardized protocol tailored for this examination and automatic exposure control for dose modulation. The absence of intravenous contrast material reduces the sensitivity for evaluation of visceral organs and vasculature including presence of small mass lesions, hemodynamically significant stenoses, dissections, mucosal abnormalities etc. FINDINGS: LOWER THORAX: No significant abnormality in the incidentally imaged lower chest. LIVER/GALLBLADDER: No mass. Gallbladder is within normal limits. CBD is not dilated. SPLEEN/PANCREAS:  within normal limits. ADRENALS/KIDNEYS: Unremarkable. Nephrocalcinosis/milk of calcium with small nonobstructive calculi. STOMACH: Unremarkable. SMALL BOWEL/COLON: There is colonic diverticulosis. Fecal stasis. APPENDIX: Unremarkable. PERITONEUM: No ascites or pneumoperitoneum. RETROPERITONEUM: Aortic atherosclerotic change. REPRODUCTIVE ORGANS: Unremarkable URINARY BLADDER: No mass or calculus. BONES: Degenerative changes of the lumbar spine. Moderate to severe canal stenosis at L3-4. Canal stenosis at L4-5 is moderate. Multilevel foraminal stenosis. ADDITIONAL COMMENTS: N/A     No acute intraperitoneal process is identified. Incidental and/or nonemergent findings are as described in detail above.        PROCEDURES   Unless otherwise noted below, none  Performed by: Lily Plata NP   Procedures      CRITICAL CARE TIME       Lily Plata NP     EMERGENCY DEPARTMENT COURSE and DIFFERENTIAL DIAGNOSIS/MDM   Vitals:    Vitals:    02/04/23 1058   BP: (!) 141/89   Pulse: 94 Resp: 18   Temp: 99.1 °F (37.3 °C)   SpO2: 98%   Weight: 68 kg (150 lb)   Height: 5' 11\" (1.803 m)        Patient was given the following medications:  Medications   ondansetron (ZOFRAN) injection 4 mg (4 mg IntraVENous Given 2/4/23 1139)   ketorolac (TORADOL) injection 15 mg (15 mg IntraVENous Given 2/4/23 1139)     Patient is a 66 y.o. male presenting for abdominal pain. Vitals reveal htn and physical exam reveals  left upper abdominal/flank pain . EKG showed  NSR @ 79 with artifact . Based on the history, physical exam, risk factors, and vitals signs, I favor the following differential diagnoses but is not limited to gastritis/PUD, pancreatitis, hepatitis, kidney stone, muscle strain, cholecystitis, cholelithiasis, pyelonephritis, colitis, enteritis, PUD, malignancy, lower lobe pneumonia, dissection, AAA, Will initiate workup and symptomatic treatment. See ED Course and Reassessment for results and interpretations. CBC with no white count noted, electrolytes within normal limits, mild elevation in ALK and AST patient with history of chronic alcohol abuse. Reports last use of 2 beers yesterday evening however baseline 12 pack/day. Light space 1.02 tolerating p.o. no nausea vomiting during ED evaluation low concern for dehydration chest x-ray grossly negative with EKG normal sinus rhythm at 79 with some artifact. CT abdomen pelvis grossly negative patient reports improvement status post Toradol. No pancreatitis, kidney stone or abnormal findings noted. Patient advised possible muscle related. Aggravated with movement, palpation, coughing supportive care pain management advised signs symptoms to return to emergency department follow-up with PCP and GI specialist.  Patient advised of smoke smoking and alcohol cessation. Verbalized understanding stable at time of discharge. ED FINAL IMPRESSION     1.  Abdominal pain, LUQ (left upper quadrant)          DISPOSITION/PLAN   Discharged    Discharge Note: The patient is stable for discharge home. The signs, symptoms, diagnosis, and discharge instructions have been discussed, understanding conveyed, and agreed upon. The patient is to follow up as recommended or return to ER should their symptoms worsen. PATIENT REFERRED TO:  Follow-up Information       Follow up With Specialties Details Why Contact Info    Mj Hilton MD Internal Medicine Physician Schedule an appointment as soon as possible for a visit in 2 days  [address]      Anay Faulkner MD Gastroenterology, Internal Medicine Physician Schedule an appointment as soon as possible for a visit in 1 week If symptoms worsen, As needed [facility] [place]  [phone]                DISCHARGE MEDICATIONS:  Current Discharge Medication List        START taking these medications    Details   naproxen (Naprosyn) 500 mg tablet Take 1 Tablet by mouth two (2) times daily (with meals) for 10 days. Qty: 20 Tablet, Refills: 0  Start date: 2/4/2023, End date: 2/14/2023      methocarbamoL (ROBAXIN) 500 mg tablet Take 1 Tablet by mouth three (3) times daily as needed for Muscle Spasm(s) or Pain (May cause drowsiness). Qty: 10 Tablet, Refills: 0  Start date: 2/4/2023               DISCONTINUED MEDICATIONS:  Current Discharge Medication List          I am the Primary Clinician of Record. Nakul Otto NP (electronically signed)    (Please note that parts of this dictation were completed with voice recognition software. Quite often unanticipated grammatical, syntax, homophones, and other interpretive errors are inadvertently transcribed by the computer software. Please disregards these errors.  Please excuse any errors that have escaped final proofreading.)

## 2023-02-05 LAB
ATRIAL RATE: 79 BPM
CALCULATED P AXIS, ECG09: 78 DEGREES
CALCULATED R AXIS, ECG10: 60 DEGREES
CALCULATED T AXIS, ECG11: 74 DEGREES
DIAGNOSIS, 93000: NORMAL
P-R INTERVAL, ECG05: 160 MS
Q-T INTERVAL, ECG07: 352 MS
QRS DURATION, ECG06: 64 MS
QTC CALCULATION (BEZET), ECG08: 403 MS
VENTRICULAR RATE, ECG03: 79 BPM

## 2023-02-13 ENCOUNTER — APPOINTMENT (OUTPATIENT)
Dept: ENDOSCOPY | Age: 58
End: 2023-02-13
Attending: INTERNAL MEDICINE
Payer: MEDICAID

## 2023-02-13 ENCOUNTER — ANESTHESIA (OUTPATIENT)
Dept: ENDOSCOPY | Age: 58
End: 2023-02-13
Payer: MEDICAID

## 2023-02-13 ENCOUNTER — HOSPITAL ENCOUNTER (OUTPATIENT)
Age: 58
Setting detail: OUTPATIENT SURGERY
Discharge: HOME | End: 2023-02-13
Attending: INTERNAL MEDICINE | Admitting: INTERNAL MEDICINE
Payer: MEDICAID

## 2023-02-13 ENCOUNTER — ANESTHESIA EVENT (OUTPATIENT)
Dept: ENDOSCOPY | Age: 58
End: 2023-02-13
Payer: MEDICAID

## 2023-02-13 VITALS
HEIGHT: 71 IN | TEMPERATURE: 97.1 F | HEART RATE: 78 BPM | RESPIRATION RATE: 16 BRPM | BODY MASS INDEX: 21 KG/M2 | OXYGEN SATURATION: 100 % | DIASTOLIC BLOOD PRESSURE: 98 MMHG | WEIGHT: 150 LBS | SYSTOLIC BLOOD PRESSURE: 149 MMHG

## 2023-02-13 PROCEDURE — 2709999900 HC NON-CHARGEABLE SUPPLY: Performed by: INTERNAL MEDICINE

## 2023-02-13 PROCEDURE — 74011000250 HC RX REV CODE- 250: Performed by: NURSE ANESTHETIST, CERTIFIED REGISTERED

## 2023-02-13 PROCEDURE — 74011250636 HC RX REV CODE- 250/636: Performed by: INTERNAL MEDICINE

## 2023-02-13 PROCEDURE — 76060000032 HC ANESTHESIA 0.5 TO 1 HR: Performed by: INTERNAL MEDICINE

## 2023-02-13 PROCEDURE — 77030019988 HC FCPS ENDOSC DISP BSC -B: Performed by: INTERNAL MEDICINE

## 2023-02-13 PROCEDURE — 88305 TISSUE EXAM BY PATHOLOGIST: CPT

## 2023-02-13 PROCEDURE — 76040000007: Performed by: INTERNAL MEDICINE

## 2023-02-13 PROCEDURE — 74011250636 HC RX REV CODE- 250/636: Performed by: NURSE ANESTHETIST, CERTIFIED REGISTERED

## 2023-02-13 RX ORDER — PROPOFOL 10 MG/ML
INJECTION, EMULSION INTRAVENOUS AS NEEDED
Status: DISCONTINUED | OUTPATIENT
Start: 2023-02-13 | End: 2023-02-13 | Stop reason: HOSPADM

## 2023-02-13 RX ORDER — LIDOCAINE HYDROCHLORIDE 20 MG/ML
INJECTION, SOLUTION EPIDURAL; INFILTRATION; INTRACAUDAL; PERINEURAL AS NEEDED
Status: DISCONTINUED | OUTPATIENT
Start: 2023-02-13 | End: 2023-02-13 | Stop reason: HOSPADM

## 2023-02-13 RX ORDER — SODIUM CHLORIDE, SODIUM LACTATE, POTASSIUM CHLORIDE, CALCIUM CHLORIDE 600; 310; 30; 20 MG/100ML; MG/100ML; MG/100ML; MG/100ML
75 INJECTION, SOLUTION INTRAVENOUS CONTINUOUS
Status: DISCONTINUED | OUTPATIENT
Start: 2023-02-13 | End: 2023-02-13 | Stop reason: HOSPADM

## 2023-02-13 RX ORDER — SODIUM CHLORIDE 9 MG/ML
25 INJECTION, SOLUTION INTRAVENOUS CONTINUOUS
Status: DISCONTINUED | OUTPATIENT
Start: 2023-02-13 | End: 2023-02-13 | Stop reason: HOSPADM

## 2023-02-13 RX ORDER — SODIUM CHLORIDE, SODIUM LACTATE, POTASSIUM CHLORIDE, CALCIUM CHLORIDE 600; 310; 30; 20 MG/100ML; MG/100ML; MG/100ML; MG/100ML
INJECTION, SOLUTION INTRAVENOUS
Status: DISCONTINUED | OUTPATIENT
Start: 2023-02-13 | End: 2023-02-13 | Stop reason: HOSPADM

## 2023-02-13 RX ADMIN — LIDOCAINE HYDROCHLORIDE 40 MG: 20 INJECTION, SOLUTION EPIDURAL; INFILTRATION; INTRACAUDAL; PERINEURAL at 09:13

## 2023-02-13 RX ADMIN — PROPOFOL 100 MG: 10 INJECTION, EMULSION INTRAVENOUS at 09:13

## 2023-02-13 RX ADMIN — PROPOFOL 100 MG: 10 INJECTION, EMULSION INTRAVENOUS at 09:18

## 2023-02-13 RX ADMIN — SODIUM CHLORIDE, POTASSIUM CHLORIDE, SODIUM LACTATE AND CALCIUM CHLORIDE: 600; 310; 30; 20 INJECTION, SOLUTION INTRAVENOUS at 09:05

## 2023-02-13 RX ADMIN — SODIUM CHLORIDE, POTASSIUM CHLORIDE, SODIUM LACTATE AND CALCIUM CHLORIDE 75 ML/HR: 600; 310; 30; 20 INJECTION, SOLUTION INTRAVENOUS at 08:37

## 2023-02-13 NOTE — PERIOP NOTES
Patient alert and oriented x4, VS stable, no complaints of pain at this time. Girlfriend at bedside. Bed in low position, call bell within reach. Patient states okay to review and give discharge instructions to riddhi Wardfrcorinna.

## 2023-02-13 NOTE — ANESTHESIA PREPROCEDURE EVALUATION
Relevant Problems   No relevant active problems       Anesthetic History   No history of anesthetic complications            Review of Systems / Medical History  Patient summary reviewed, nursing notes reviewed and pertinent labs reviewed    Pulmonary    COPD      Shortness of breath         Neuro/Psych     seizures         Cardiovascular    Hypertension                   GI/Hepatic/Renal  Within defined limits              Endo/Other  Within defined limits           Other Findings   Comments: etoh abuse         Past Medical History:   Diagnosis Date    Alcohol abuse     COPD (chronic obstructive pulmonary disease) (HCC)     Fracture of bone of left shoulder     Hypertension     Polysubstance abuse ([address]     Seizures (HCC)        Past Surgical History:   Procedure Laterality Date    HX ORTHOPAEDIC         Current Outpatient Medications   Medication Instructions    DULoxetine (CYMBALTA) 60 mg capsule 1 Capsule, Oral, EVERY BEDTIME    folic acid (FOLVITE) 1 mg, Oral, DAILY    labetaloL (NORMODYNE) 200 mg, Oral, 2 TIMES DAILY    levETIRAcetam (KEPPRA) 1,500 mg, Oral, 2 TIMES DAILY    lipase-protease-amylase (Creon) 12,000-38,000 -60,000 unit capsule 1 Capsule, Oral, 3 TIMES DAILY WITH MEALS    methocarbamoL (ROBAXIN) 500 mg, Oral, 3 TIMES DAILY AS NEEDED    multivitamin (ONE A DAY) tablet 1 Tablet, Oral, DAILY    naproxen (NAPROSYN) 500 mg, Oral, 2 TIMES DAILY WITH MEALS    pramipexole (MIRAPEX) 1 mg tablet 1 Tablet, Oral, EVERY BEDTIME       Current Facility-Administered Medications   Medication Dose Route Frequency    0.9% sodium chloride infusion  25 mL/hr IntraVENous CONTINUOUS    lactated Ringers infusion  75 mL/hr IntraVENous CONTINUOUS       Patient Vitals for the past 24 hrs:   Temp Pulse Resp BP SpO2   02/13/23 0815 36.6 °C (97.8 °F) 73 16 139/85 100 %       Lab Results   Component Value Date/Time    WBC 6.4 02/04/2023 11:35 AM    HGB 12.2 02/04/2023 11:35 AM    HCT 36.3 (L) 02/04/2023 11:35 AM    PLATELET 515 18/38/3994 11:35 AM    MCV 88.8 02/04/2023 11:35 AM     Lab Results   Component Value Date/Time    Sodium 139 02/04/2023 11:35 AM    Potassium 4.2 02/04/2023 11:35 AM    Chloride 103 02/04/2023 11:35 AM    CO2 25 02/04/2023 11:35 AM    Anion gap 11 02/04/2023 11:35 AM    Glucose 92 02/04/2023 11:35 AM    BUN 10 02/04/2023 11:35 AM    Creatinine 1.15 02/04/2023 11:35 AM    BUN/Creatinine ratio 9 (L) 02/04/2023 11:35 AM    Calcium 9.3 02/04/2023 11:35 AM     No results found for: APTT, PTP, INR, INREXT  Lab Results   Component Value Date/Time    Glucose 92 02/04/2023 11:35 AM     Physical Exam    Airway  Mallampati: II  TM Distance: 4 - 6 cm  Neck ROM: normal range of motion   Mouth opening: Normal     Cardiovascular    Rhythm: regular  Rate: normal         Dental  No notable dental hx       Pulmonary  Breath sounds clear to auscultation               Abdominal  GI exam deferred       Other Findings            Anesthetic Plan    ASA: 3  Anesthesia type: total IV anesthesia and MAC    Monitoring Plan: Continuous noninvasive hemodynamic monitoring      Induction: Intravenous  Anesthetic plan and risks discussed with: Patient and Family

## 2023-02-16 ENCOUNTER — HOSPITAL ENCOUNTER (OUTPATIENT)
Dept: ULTRASOUND IMAGING | Age: 58
Discharge: HOME | End: 2023-02-16
Attending: INTERNAL MEDICINE
Payer: MEDICAID

## 2023-02-16 DIAGNOSIS — R10.9 LEFT FLANK PAIN: ICD-10-CM

## 2023-02-16 PROCEDURE — 76770 US EXAM ABDO BACK WALL COMP: CPT

## 2023-02-17 DIAGNOSIS — E83.59 NEPHROCALCINOSIS: Primary | ICD-10-CM

## 2023-02-17 DIAGNOSIS — N29 NEPHROCALCINOSIS: Primary | ICD-10-CM

## 2023-03-03 DIAGNOSIS — I10 PRIMARY HYPERTENSION: ICD-10-CM

## 2023-03-04 RX ORDER — LABETALOL 200 MG/1
TABLET, FILM COATED ORAL
Qty: 90 TABLET | Refills: 1 | Status: SHIPPED | OUTPATIENT
Start: 2023-03-04

## 2023-03-21 NOTE — ANESTHESIA POSTPROCEDURE EVALUATION
Procedure(s):  COLONOSCOPY  ENDOSCOPIC POLYPECTOMY. total IV anesthesia, MAC    Anesthesia Post Evaluation      Multimodal analgesia: multimodal analgesia not used between 6 hours prior to anesthesia start to PACU discharge  Patient location during evaluation: bedside (Endoscopy suite)  Patient participation: complete - patient cannot participate  Level of consciousness: sleepy but conscious  Pain score: 0  Pain management: adequate  Airway patency: patent  Anesthetic complications: no  Cardiovascular status: acceptable  Respiratory status: acceptable and nasal cannula  Hydration status: acceptable  Comments: This patient remained on the stretcher. The patient was handed off to the endoscopy nursing team.  All questions regarding pre-, intra-, and postoperative care were answered.   Post anesthesia nausea and vomiting:  none      INITIAL Post-op Vital signs:   Vitals Value Taken Time   /98 02/13/23 1002   Temp 36.2 °C (97.1 °F) 02/13/23 0936   Pulse 78 02/13/23 1002   Resp 16 02/13/23 1002   SpO2 100 % 02/13/23 1002

## 2023-04-22 DIAGNOSIS — D64.9 MILD ANEMIA: Primary | ICD-10-CM

## 2023-05-02 DIAGNOSIS — D64.9 MILD ANEMIA: ICD-10-CM

## 2023-05-11 ENCOUNTER — TELEPHONE (OUTPATIENT)
Facility: CLINIC | Age: 58
End: 2023-05-11

## 2023-05-25 RX ORDER — DULOXETIN HYDROCHLORIDE 30 MG/1
CAPSULE, DELAYED RELEASE ORAL
COMMUNITY
Start: 2023-02-16

## 2023-05-25 RX ORDER — TOPIRAMATE 25 MG/1
25 TABLET ORAL 2 TIMES DAILY
COMMUNITY
Start: 2023-04-17 | End: 2023-05-26

## 2023-05-25 RX ORDER — TOPIRAMATE 25 MG/1
25 TABLET ORAL 2 TIMES DAILY
COMMUNITY
Start: 2023-04-17

## 2023-05-25 NOTE — PROGRESS NOTES
voice recognition software. Despite editing, there may be unrecognized errors. An electronic signature was used to authenticate this note.   -- Kassidy Toth MD

## 2023-05-26 ENCOUNTER — OFFICE VISIT (OUTPATIENT)
Facility: CLINIC | Age: 58
End: 2023-05-26
Payer: COMMERCIAL

## 2023-05-26 VITALS
OXYGEN SATURATION: 99 % | WEIGHT: 146 LBS | HEART RATE: 82 BPM | SYSTOLIC BLOOD PRESSURE: 142 MMHG | HEIGHT: 71 IN | TEMPERATURE: 97.4 F | BODY MASS INDEX: 20.44 KG/M2 | DIASTOLIC BLOOD PRESSURE: 92 MMHG

## 2023-05-26 DIAGNOSIS — D64.9 MILD ANEMIA: ICD-10-CM

## 2023-05-26 DIAGNOSIS — M20.12 VALGUS DEFORMITY OF BOTH GREAT TOES: ICD-10-CM

## 2023-05-26 DIAGNOSIS — Z11.59 NEED FOR HEPATITIS C SCREENING TEST: ICD-10-CM

## 2023-05-26 DIAGNOSIS — Z78.9 ALCOHOL USE: ICD-10-CM

## 2023-05-26 DIAGNOSIS — E87.5 HYPERKALEMIA: ICD-10-CM

## 2023-05-26 DIAGNOSIS — I10 ESSENTIAL HYPERTENSION, BENIGN: Primary | ICD-10-CM

## 2023-05-26 DIAGNOSIS — Z11.4 SCREENING FOR HIV WITHOUT PRESENCE OF RISK FACTORS: ICD-10-CM

## 2023-05-26 DIAGNOSIS — M20.11 VALGUS DEFORMITY OF BOTH GREAT TOES: ICD-10-CM

## 2023-05-26 DIAGNOSIS — Z72.0 TOBACCO USE: ICD-10-CM

## 2023-05-26 DIAGNOSIS — R79.89 ABNORMAL LFTS: ICD-10-CM

## 2023-05-26 DIAGNOSIS — S42.352D CLOSED DISPLACED COMMINUTED FRACTURE OF SHAFT OF LEFT HUMERUS WITH ROUTINE HEALING: ICD-10-CM

## 2023-05-26 PROBLEM — M25.612 POST-TRAUMATIC STIFFNESS OF LEFT SHOULDER JOINT: Status: ACTIVE | Noted: 2023-05-02

## 2023-05-26 PROBLEM — S42.92XA CLOSED FRACTURE DISLOCATION OF LEFT SHOULDER JOINT: Status: ACTIVE | Noted: 2023-05-02

## 2023-05-26 PROCEDURE — 3078F DIAST BP <80 MM HG: CPT | Performed by: INTERNAL MEDICINE

## 2023-05-26 PROCEDURE — 99214 OFFICE O/P EST MOD 30 MIN: CPT | Performed by: INTERNAL MEDICINE

## 2023-05-26 PROCEDURE — 3074F SYST BP LT 130 MM HG: CPT | Performed by: INTERNAL MEDICINE

## 2023-05-26 RX ORDER — ZOSTER VACCINE RECOMBINANT, ADJUVANTED 50 MCG/0.5
0.5 KIT INTRAMUSCULAR SEE ADMIN INSTRUCTIONS
Qty: 0.5 ML | Refills: 0 | Status: SHIPPED | OUTPATIENT
Start: 2023-05-26 | End: 2023-05-26 | Stop reason: SDUPTHER

## 2023-05-26 RX ORDER — MULTIVITAMIN
TABLET ORAL
COMMUNITY
Start: 2022-11-19

## 2023-05-26 RX ORDER — ERGOCALCIFEROL 1.25 MG/1
CAPSULE ORAL
COMMUNITY
Start: 2022-12-30

## 2023-05-26 RX ORDER — ZOSTER VACCINE RECOMBINANT, ADJUVANTED 50 MCG/0.5
0.5 KIT INTRAMUSCULAR SEE ADMIN INSTRUCTIONS
Qty: 0.5 ML | Refills: 0 | Status: SHIPPED | OUTPATIENT
Start: 2023-05-26 | End: 2023-11-22

## 2023-05-26 SDOH — ECONOMIC STABILITY: FOOD INSECURITY: WITHIN THE PAST 12 MONTHS, THE FOOD YOU BOUGHT JUST DIDN'T LAST AND YOU DIDN'T HAVE MONEY TO GET MORE.: NEVER TRUE

## 2023-05-26 SDOH — ECONOMIC STABILITY: HOUSING INSECURITY
IN THE LAST 12 MONTHS, WAS THERE A TIME WHEN YOU DID NOT HAVE A STEADY PLACE TO SLEEP OR SLEPT IN A SHELTER (INCLUDING NOW)?: NO

## 2023-05-26 SDOH — ECONOMIC STABILITY: INCOME INSECURITY: HOW HARD IS IT FOR YOU TO PAY FOR THE VERY BASICS LIKE FOOD, HOUSING, MEDICAL CARE, AND HEATING?: NOT HARD AT ALL

## 2023-05-26 SDOH — ECONOMIC STABILITY: FOOD INSECURITY: WITHIN THE PAST 12 MONTHS, YOU WORRIED THAT YOUR FOOD WOULD RUN OUT BEFORE YOU GOT MONEY TO BUY MORE.: NEVER TRUE

## 2023-06-09 RX ORDER — LABETALOL 200 MG/1
TABLET, FILM COATED ORAL
Qty: 180 TABLET | Refills: 0 | Status: SHIPPED | OUTPATIENT
Start: 2023-06-09

## 2023-09-11 RX ORDER — LABETALOL 200 MG/1
TABLET, FILM COATED ORAL
Qty: 180 TABLET | Refills: 0 | Status: SHIPPED | OUTPATIENT
Start: 2023-09-11

## 2023-09-28 ENCOUNTER — OFFICE VISIT (OUTPATIENT)
Facility: CLINIC | Age: 58
End: 2023-09-28
Payer: COMMERCIAL

## 2023-09-28 VITALS
HEIGHT: 71 IN | BODY MASS INDEX: 20.3 KG/M2 | TEMPERATURE: 97.3 F | SYSTOLIC BLOOD PRESSURE: 136 MMHG | HEART RATE: 77 BPM | OXYGEN SATURATION: 98 % | WEIGHT: 145 LBS | DIASTOLIC BLOOD PRESSURE: 82 MMHG

## 2023-09-28 DIAGNOSIS — I10 ESSENTIAL HYPERTENSION, BENIGN: ICD-10-CM

## 2023-09-28 DIAGNOSIS — K86.81 EXOCRINE PANCREATIC INSUFFICIENCY: ICD-10-CM

## 2023-09-28 DIAGNOSIS — F17.200 TOBACCO USE DISORDER: ICD-10-CM

## 2023-09-28 DIAGNOSIS — R19.7 DIARRHEA, UNSPECIFIED TYPE: ICD-10-CM

## 2023-09-28 DIAGNOSIS — R19.7 DIARRHEA, UNSPECIFIED TYPE: Primary | ICD-10-CM

## 2023-09-28 DIAGNOSIS — R10.30 LOWER ABDOMINAL PAIN: ICD-10-CM

## 2023-09-28 DIAGNOSIS — J20.9 ACUTE BRONCHITIS WITH WHEEZING: ICD-10-CM

## 2023-09-28 PROCEDURE — 3075F SYST BP GE 130 - 139MM HG: CPT | Performed by: INTERNAL MEDICINE

## 2023-09-28 PROCEDURE — 99214 OFFICE O/P EST MOD 30 MIN: CPT | Performed by: INTERNAL MEDICINE

## 2023-09-28 PROCEDURE — 3079F DIAST BP 80-89 MM HG: CPT | Performed by: INTERNAL MEDICINE

## 2023-09-28 RX ORDER — AZITHROMYCIN 250 MG/1
TABLET, FILM COATED ORAL
Qty: 6 TABLET | Refills: 0 | Status: SHIPPED | OUTPATIENT
Start: 2023-09-28

## 2023-09-28 RX ORDER — ALBUTEROL SULFATE 90 UG/1
2 AEROSOL, METERED RESPIRATORY (INHALATION) 4 TIMES DAILY PRN
Qty: 54 G | Refills: 1 | Status: SHIPPED | OUTPATIENT
Start: 2023-09-28

## 2023-09-28 RX ORDER — PANCRELIPASE 60000; 12000; 38000 [USP'U]/1; [USP'U]/1; [USP'U]/1
1 CAPSULE, DELAYED RELEASE PELLETS ORAL
Qty: 270 CAPSULE | Refills: 1 | Status: SHIPPED | OUTPATIENT
Start: 2023-09-28

## 2023-09-28 RX ORDER — DICYCLOMINE HYDROCHLORIDE 10 MG/1
10 CAPSULE ORAL 2 TIMES DAILY PRN
Qty: 60 CAPSULE | Refills: 0 | Status: SHIPPED | OUTPATIENT
Start: 2023-09-28

## 2023-09-28 NOTE — PROGRESS NOTES
Chief Complaint   Patient presents with    Follow-up Chronic Condition    Abdominal Pain         1. \"Have you been to the ER, urgent care clinic since your last visit? Hospitalized since your last visit? \" No    2. \"Have you seen or consulted any other health care providers outside of the [address] [place] [address] since your last visit? \"  Dr. Gregor Garcia      3. For patients aged 43-73: Has the patient had a colonoscopy / FIT/ Cologuard? Yes - Care Gap present. Most recent result on file      If the patient is female:    4. For patients aged 43-66: Has the patient had a mammogram within the past 2 years? NA - based on age or sex      11. For patients aged 21-65: Has the patient had a pap smear?  NA - based on age or sex

## 2023-09-28 NOTE — PROGRESS NOTES
[address] [facility]  [address] [facility] Louise Hwang, [address]  Phone: [phone]      Ayala Bauer (: 1965) is a 66 y.o. male, established patient, here for evaluation of the following chief complaint(s):  Follow-up Chronic Condition and Abdominal Pain   SUBJECTIVE/OBJECTIVE:  HPI:  Patient is here for follow up, he did not have routine blood work for this visit. He has not been in the ER or the hospital since his last visit. He has been having lower abdominal pain. He states that it has been coming and going for the last couple of weeks. He also has nausea, vomiting, dry cough, wheezing, dyspnea, and diarrhea. He took a home COVID test a few weeks ago and it was negative. Patient stated that he is not taking the Creon. He stated he still smoking cigarettes but is trying to cut down. He has his fiance, Telicia with him . He is not sure about refills.    Current Outpatient Medications   Medication Sig    lipase-protease-amylase (CREON) 27097-93963 units delayed release capsule Take 1 capsule by mouth 3 times daily (with meals)    azithromycin (ZITHROMAX Z-NANDO) 250 MG tablet 2 tabs first day, then 1 tab daily for 4 days    albuterol sulfate HFA (VENTOLIN HFA) 108 (90 Base) MCG/ACT inhaler Inhale 2 puffs into the lungs 4 times daily as needed for Wheezing    labetalol (NORMODYNE) 200 MG tablet take 1 tablet by mouth twice a day    ergocalciferol (ERGOCALCIFEROL) 1.25 MG (20401 UT) capsule take 1 capsule by mouth every week for 6 doses    Multiple Vitamin (MULTIVITAMIN) TABS     DULoxetine (CYMBALTA) 30 MG extended release capsule     topiramate (TOPAMAX) 25 MG tablet Take 1 tablet by mouth 2 times daily    DULoxetine (CYMBALTA) 60 MG extended release capsule Take 1 capsule by mouth nightly    folic acid (FOLVITE) 1 MG tablet Take 1 tablet by mouth daily    levETIRAcetam (KEPPRA) 750 MG tablet Take 2 tablets by mouth 2 times daily    methocarbamol (ROBAXIN) 500 MG tablet

## 2023-11-20 ENCOUNTER — HOSPITAL ENCOUNTER (OUTPATIENT)
Facility: HOSPITAL | Age: 58
Discharge: HOME | End: 2023-11-23
Payer: COMMERCIAL

## 2023-11-20 DIAGNOSIS — J20.9 ACUTE BRONCHITIS WITH WHEEZING: ICD-10-CM

## 2023-11-20 PROCEDURE — 71046 X-RAY EXAM CHEST 2 VIEWS: CPT

## 2023-11-21 RX ORDER — DULOXETIN HYDROCHLORIDE 30 MG/1
30 CAPSULE, DELAYED RELEASE ORAL DAILY
Qty: 30 CAPSULE | Refills: 0 | Status: CANCELLED | OUTPATIENT
Start: 2023-11-21

## 2023-11-30 RX ORDER — DULOXETIN HYDROCHLORIDE 60 MG/1
60 CAPSULE, DELAYED RELEASE ORAL NIGHTLY
Qty: 30 CAPSULE | Refills: 3 | Status: SHIPPED | OUTPATIENT
Start: 2023-11-30

## 2023-12-06 LAB
ALBUMIN SERPL-MCNC: 4.5 G/DL (ref 3.8–4.9)
ALBUMIN/GLOB SERPL: 1.5 {RATIO} (ref 1.2–2.2)
ALP SERPL-CCNC: 121 IU/L (ref 44–121)
ALT SERPL-CCNC: 54 IU/L (ref 0–44)
AST SERPL-CCNC: 161 IU/L (ref 0–40)
BASOPHILS # BLD AUTO: 0 X10E3/UL (ref 0–0.2)
BASOPHILS NFR BLD AUTO: 1 %
BILIRUB SERPL-MCNC: 0.6 MG/DL (ref 0–1.2)
BUN SERPL-MCNC: 7 MG/DL (ref 6–24)
BUN/CREAT SERPL: 7 (ref 9–20)
CALCIUM SERPL-MCNC: 9.7 MG/DL (ref 8.7–10.2)
CHLORIDE SERPL-SCNC: 99 MMOL/L (ref 96–106)
CO2 SERPL-SCNC: 20 MMOL/L (ref 20–29)
CREAT SERPL-MCNC: 0.96 MG/DL (ref 0.76–1.27)
EGFRCR SERPLBLD CKD-EPI 2021: 93 ML/MIN/1.73
EOSINOPHIL # BLD AUTO: 0.1 X10E3/UL (ref 0–0.4)
EOSINOPHIL NFR BLD AUTO: 2 %
ERYTHROCYTE [DISTWIDTH] IN BLOOD BY AUTOMATED COUNT: 16.1 % (ref 11.6–15.4)
GLOBULIN SER CALC-MCNC: 3.1 G/DL (ref 1.5–4.5)
GLUCOSE SERPL-MCNC: 82 MG/DL (ref 70–99)
HCT VFR BLD AUTO: 35.2 % (ref 37.5–51)
HGB BLD-MCNC: 12.6 G/DL (ref 13–17.7)
IMM GRANULOCYTES # BLD AUTO: 0.1 X10E3/UL (ref 0–0.1)
IMM GRANULOCYTES NFR BLD AUTO: 1 %
LYMPHOCYTES # BLD AUTO: 2.7 X10E3/UL (ref 0.7–3.1)
LYMPHOCYTES NFR BLD AUTO: 34 %
MCH RBC QN AUTO: 36.7 PG (ref 26.6–33)
MCHC RBC AUTO-ENTMCNC: 35.8 G/DL (ref 31.5–35.7)
MCV RBC AUTO: 103 FL (ref 79–97)
MONOCYTES # BLD AUTO: 1 X10E3/UL (ref 0.1–0.9)
MONOCYTES NFR BLD AUTO: 13 %
NEUTROPHILS # BLD AUTO: 3.9 X10E3/UL (ref 1.4–7)
NEUTROPHILS NFR BLD AUTO: 49 %
PLATELET # BLD AUTO: 120 X10E3/UL (ref 150–450)
POTASSIUM SERPL-SCNC: 4.8 MMOL/L (ref 3.5–5.2)
PROT SERPL-MCNC: 7.6 G/DL (ref 6–8.5)
RBC # BLD AUTO: 3.43 X10E6/UL (ref 4.14–5.8)
SODIUM SERPL-SCNC: 133 MMOL/L (ref 134–144)
WBC # BLD AUTO: 7.8 X10E3/UL (ref 3.4–10.8)

## 2023-12-10 DIAGNOSIS — D64.9 MILD ANEMIA: Primary | ICD-10-CM

## 2023-12-10 DIAGNOSIS — I10 ESSENTIAL HYPERTENSION, BENIGN: ICD-10-CM

## 2023-12-14 RX ORDER — LABETALOL 200 MG/1
TABLET, FILM COATED ORAL
Qty: 180 TABLET | Refills: 0 | Status: SHIPPED | OUTPATIENT
Start: 2023-12-14

## 2024-01-06 RX ORDER — DULOXETIN HYDROCHLORIDE 60 MG/1
60 CAPSULE, DELAYED RELEASE ORAL DAILY
Qty: 90 CAPSULE | Refills: 0 | Status: SHIPPED | OUTPATIENT
Start: 2024-01-06

## 2024-02-28 ENCOUNTER — OFFICE VISIT (OUTPATIENT)
Facility: CLINIC | Age: 59
End: 2024-02-28
Payer: COMMERCIAL

## 2024-02-28 VITALS
SYSTOLIC BLOOD PRESSURE: 144 MMHG | WEIGHT: 145 LBS | HEART RATE: 101 BPM | DIASTOLIC BLOOD PRESSURE: 90 MMHG | BODY MASS INDEX: 20.3 KG/M2 | OXYGEN SATURATION: 97 % | TEMPERATURE: 98.3 F | HEIGHT: 71 IN

## 2024-02-28 DIAGNOSIS — Z13.220 SCREENING CHOLESTEROL LEVEL: ICD-10-CM

## 2024-02-28 DIAGNOSIS — E55.9 VITAMIN D DEFICIENCY: ICD-10-CM

## 2024-02-28 DIAGNOSIS — M20.11 HALLUX VALGUS OF RIGHT FOOT: ICD-10-CM

## 2024-02-28 DIAGNOSIS — Z78.9 ALCOHOL USE: ICD-10-CM

## 2024-02-28 DIAGNOSIS — E87.1 HYPONATREMIA: ICD-10-CM

## 2024-02-28 DIAGNOSIS — Z12.5 SCREENING PSA (PROSTATE SPECIFIC ANTIGEN): ICD-10-CM

## 2024-02-28 DIAGNOSIS — D64.9 MILD ANEMIA: ICD-10-CM

## 2024-02-28 DIAGNOSIS — R79.89 ABNORMAL LFTS: ICD-10-CM

## 2024-02-28 DIAGNOSIS — I10 ESSENTIAL HYPERTENSION, BENIGN: Primary | ICD-10-CM

## 2024-02-28 DIAGNOSIS — K86.81 EXOCRINE PANCREATIC INSUFFICIENCY: ICD-10-CM

## 2024-02-28 DIAGNOSIS — I10 ESSENTIAL HYPERTENSION, BENIGN: ICD-10-CM

## 2024-02-28 PROCEDURE — 3080F DIAST BP >= 90 MM HG: CPT | Performed by: INTERNAL MEDICINE

## 2024-02-28 PROCEDURE — 3077F SYST BP >= 140 MM HG: CPT | Performed by: INTERNAL MEDICINE

## 2024-02-28 PROCEDURE — 99214 OFFICE O/P EST MOD 30 MIN: CPT | Performed by: INTERNAL MEDICINE

## 2024-02-28 RX ORDER — FERROUS SULFATE 325(65) MG
325 TABLET ORAL 2 TIMES DAILY
Qty: 180 TABLET | Refills: 1 | Status: SHIPPED | OUTPATIENT
Start: 2024-02-28

## 2024-02-28 ASSESSMENT — PATIENT HEALTH QUESTIONNAIRE - PHQ9
1. LITTLE INTEREST OR PLEASURE IN DOING THINGS: 0
SUM OF ALL RESPONSES TO PHQ QUESTIONS 1-9: 0
SUM OF ALL RESPONSES TO PHQ QUESTIONS 1-9: 0
SUM OF ALL RESPONSES TO PHQ9 QUESTIONS 1 & 2: 0
2. FEELING DOWN, DEPRESSED OR HOPELESS: 0
SUM OF ALL RESPONSES TO PHQ QUESTIONS 1-9: 0
SUM OF ALL RESPONSES TO PHQ QUESTIONS 1-9: 0

## 2024-02-28 NOTE — PROGRESS NOTES
Chief Complaint   Patient presents with    Discuss Labs    Foot Pain    Follow-up Chronic Condition         1. \"Have you been to the ER, urgent care clinic since your last visit?  Hospitalized since your last visit?\" No    2. \"Have you seen or consulted any other health care providers outside of the [facility] since your last visit?\"  Dr. Loja      3. For patients aged 45-75: Has the patient had a colonoscopy / FIT/ Cologuard? Yes - Care Gap present. Most recent result on file      If the patient is female:    4. For patients aged 40-74: Has the patient had a mammogram within the past 2 years? NA - based on age or sex      5. For patients aged 21-65: Has the patient had a pap smear? NA - based on age or sex  
Date:   2/28/2025    PSA Screening     Standing Status:   Future     Standing Expiration Date:   2/28/2025    TSH     Standing Status:   Future     Standing Expiration Date:   2/28/2025    Vitamin D 25 Hydroxy     Standing Status:   Future     Standing Expiration Date:   2/28/2025    Vitamin B12     Standing Status:   Future     Standing Expiration Date:   2/28/2025    Folate     Standing Status:   Future     Standing Expiration Date:   2/28/2025    [facility] - Bob Solorio DPM, Podiatry, [place]     Referral Priority:   Routine     Referral Type:   Eval and Treat     Referral Reason:   Specialty Services Required     Referred to Provider:   Bob Solorio DPM     Requested Specialty:   Podiatry     Number of Visits Requested:   1    ferrous sulfate (IRON 325) 325 (65 Fe) MG tablet     Sig: Take 1 tablet by mouth 2 times daily     Dispense:  180 tablet     Refill:  1        Return in about 3 months (around 5/28/2024), or if symptoms worsen or fail to improve, for follow up, labs.     There are no Patient Instructions on file for this visit.     Health Maintenance Due   Topic Date Due    Hepatitis B vaccine (1 of 3 - 3-dose series) Never done    Pneumococcal 0-64 years Vaccine (1 - PCV) Never done    HIV screen  Never done    Hepatitis C screen  Never done    DTaP/Tdap/Td vaccine (1 - Tdap) Never done    Shingles vaccine (1 of 2) Never done    Flu vaccine (1) 08/01/2023    COVID-19 Vaccine (3 - 2023-24 season) 09/01/2023    Depression Screen  02/02/2024        Aspects of this note may have been generated using voice recognition software. Despite editing, there may be unrecognized errors.    An electronic signature was used to authenticate this note.  -- Eveline Macias MD

## 2024-04-05 RX ORDER — DULOXETIN HYDROCHLORIDE 60 MG/1
60 CAPSULE, DELAYED RELEASE ORAL DAILY
Qty: 90 CAPSULE | Refills: 0 | Status: SHIPPED | OUTPATIENT
Start: 2024-04-05

## 2024-04-09 RX ORDER — LABETALOL 200 MG/1
TABLET, FILM COATED ORAL
Qty: 180 TABLET | Refills: 0 | Status: SHIPPED | OUTPATIENT
Start: 2024-04-09

## 2024-04-22 ENCOUNTER — OFFICE VISIT (OUTPATIENT)
Age: 59
End: 2024-04-22
Payer: COMMERCIAL

## 2024-04-22 VITALS
WEIGHT: 140 LBS | SYSTOLIC BLOOD PRESSURE: 125 MMHG | HEIGHT: 71 IN | HEART RATE: 89 BPM | BODY MASS INDEX: 19.6 KG/M2 | RESPIRATION RATE: 16 BRPM | TEMPERATURE: 97.3 F | DIASTOLIC BLOOD PRESSURE: 86 MMHG | OXYGEN SATURATION: 99 %

## 2024-04-22 DIAGNOSIS — M21.611 BUNION, RIGHT FOOT: ICD-10-CM

## 2024-04-22 DIAGNOSIS — B35.1 DERMATOPHYTOSIS OF NAIL: ICD-10-CM

## 2024-04-22 DIAGNOSIS — M21.612 BUNION OF LEFT FOOT: Primary | ICD-10-CM

## 2024-04-22 PROCEDURE — 99204 OFFICE O/P NEW MOD 45 MIN: CPT | Performed by: PODIATRIST

## 2024-04-22 RX ORDER — CLOTRIMAZOLE 1 %
CREAM (GRAM) TOPICAL
Qty: 45 G | Refills: 1 | Status: SHIPPED | OUTPATIENT
Start: 2024-04-22 | End: 2024-04-29

## 2024-04-22 NOTE — PROGRESS NOTES
Chief Complaint   Patient presents with    New Patient     /86   Pulse 89   Temp 97.3 °F (36.3 °C)   Resp 16   Ht 1.803 m (5' 11\")   Wt 63.5 kg (140 lb)   SpO2 99%   BMI 19.53 kg/m²   1. Have you been to the ER, urgent care clinic since your last visit?  Hospitalized since your last visit?No    2. Have you seen or consulted any other health care providers outside of the [facility] since your last visit?  Include any pap smears or colon screening. No

## 2024-05-18 LAB
ALBUMIN SERPL-MCNC: 4.3 G/DL (ref 3.8–4.9)
ALBUMIN/GLOB SERPL: 1.5 {RATIO} (ref 1.2–2.2)
ALP SERPL-CCNC: 119 IU/L (ref 44–121)
ALT SERPL-CCNC: 51 IU/L (ref 0–44)
AST SERPL-CCNC: 189 IU/L (ref 0–40)
BASOPHILS # BLD AUTO: 0 X10E3/UL (ref 0–0.2)
BASOPHILS NFR BLD AUTO: 1 %
BILIRUB SERPL-MCNC: 0.3 MG/DL (ref 0–1.2)
BUN SERPL-MCNC: 7 MG/DL (ref 6–24)
BUN/CREAT SERPL: 9 (ref 9–20)
CALCIUM SERPL-MCNC: 9.3 MG/DL (ref 8.7–10.2)
CHLORIDE SERPL-SCNC: 97 MMOL/L (ref 96–106)
CO2 SERPL-SCNC: 20 MMOL/L (ref 20–29)
CREAT SERPL-MCNC: 0.77 MG/DL (ref 0.76–1.27)
EGFRCR SERPLBLD CKD-EPI 2021: 104 ML/MIN/1.73
EOSINOPHIL # BLD AUTO: 0.1 X10E3/UL (ref 0–0.4)
EOSINOPHIL NFR BLD AUTO: 2 %
ERYTHROCYTE [DISTWIDTH] IN BLOOD BY AUTOMATED COUNT: 14.2 % (ref 11.6–15.4)
GLOBULIN SER CALC-MCNC: 2.9 G/DL (ref 1.5–4.5)
GLUCOSE SERPL-MCNC: 90 MG/DL (ref 70–99)
HCT VFR BLD AUTO: 36.8 % (ref 37.5–51)
HGB BLD-MCNC: 13.1 G/DL (ref 13–17.7)
IMM GRANULOCYTES # BLD AUTO: 0 X10E3/UL (ref 0–0.1)
IMM GRANULOCYTES NFR BLD AUTO: 0 %
IRON SATN MFR SERPL: 44 % (ref 15–55)
IRON SERPL-MCNC: 110 UG/DL (ref 38–169)
LYMPHOCYTES # BLD AUTO: 3 X10E3/UL (ref 0.7–3.1)
LYMPHOCYTES NFR BLD AUTO: 47 %
MCH RBC QN AUTO: 37 PG (ref 26.6–33)
MCHC RBC AUTO-ENTMCNC: 35.6 G/DL (ref 31.5–35.7)
MCV RBC AUTO: 104 FL (ref 79–97)
MONOCYTES # BLD AUTO: 0.6 X10E3/UL (ref 0.1–0.9)
MONOCYTES NFR BLD AUTO: 10 %
NEUTROPHILS # BLD AUTO: 2.5 X10E3/UL (ref 1.4–7)
NEUTROPHILS NFR BLD AUTO: 40 %
PLATELET # BLD AUTO: 139 X10E3/UL (ref 150–450)
POTASSIUM SERPL-SCNC: 5.2 MMOL/L (ref 3.5–5.2)
PROT SERPL-MCNC: 7.2 G/DL (ref 6–8.5)
RBC # BLD AUTO: 3.54 X10E6/UL (ref 4.14–5.8)
SODIUM SERPL-SCNC: 130 MMOL/L (ref 134–144)
TIBC SERPL-MCNC: 249 UG/DL (ref 250–450)
UIBC SERPL-MCNC: 139 UG/DL (ref 111–343)
WBC # BLD AUTO: 6.3 X10E3/UL (ref 3.4–10.8)

## 2024-07-01 ENCOUNTER — TELEPHONE (OUTPATIENT)
Facility: CLINIC | Age: 59
End: 2024-07-01

## 2024-07-01 NOTE — TELEPHONE ENCOUNTER
----- Message from Eagle Ohara sent at 6/28/2024  3:06 PM EDT -----  Regarding: ECC Appointment Request  ECC Appointment Request    Patient needs appointment for ECC Appointment Type: Existing Condition Follow Up.    The patient is requesting to move the appointment he have an appointment on September 3rd at 2:30 requesting to have an earliest appointment Appt Reason: Routine Existing Condition Follow Up  r/s [id number]   Booking Code: [id number]       Reason for Appointment Request: Available appointments did not meet patient need  --------------------------------------------------------------------------------------------------------------------------    Relationship to Patient: Self     Call Back Information: OK to leave message on [employer]  Preferred Call Back Number: Phone   [phone]

## 2024-07-02 DIAGNOSIS — K86.81 EXOCRINE PANCREATIC INSUFFICIENCY: ICD-10-CM

## 2024-07-02 NOTE — TELEPHONE ENCOUNTER
Spoke to pt. No earlier appointments for physical were available. I offered to put him on the waiting list however he declined. RODRI REEVES MA

## 2024-07-03 RX ORDER — PANCRELIPASE 60000; 12000; 38000 [USP'U]/1; [USP'U]/1; [USP'U]/1
CAPSULE, DELAYED RELEASE PELLETS ORAL
Qty: 270 CAPSULE | Refills: 1 | Status: SHIPPED | OUTPATIENT
Start: 2024-07-03

## 2024-07-29 DIAGNOSIS — D64.9 MILD ANEMIA: Primary | ICD-10-CM

## 2024-07-29 DIAGNOSIS — E87.1 HYPONATREMIA: ICD-10-CM

## 2024-07-29 DIAGNOSIS — R79.89 ABNORMAL LFTS: ICD-10-CM

## 2024-07-29 DIAGNOSIS — D64.9 MILD ANEMIA: ICD-10-CM

## 2024-08-07 RX ORDER — CLOTRIMAZOLE 1 %
CREAM (GRAM) TOPICAL
Qty: 45 G | Refills: 1 | OUTPATIENT
Start: 2024-08-07

## 2024-08-08 RX ORDER — DULOXETIN HYDROCHLORIDE 60 MG/1
60 CAPSULE, DELAYED RELEASE ORAL DAILY
Qty: 30 CAPSULE | Refills: 0 | Status: SHIPPED | OUTPATIENT
Start: 2024-08-08

## 2024-09-03 ENCOUNTER — OFFICE VISIT (OUTPATIENT)
Facility: CLINIC | Age: 59
End: 2024-09-03
Payer: COMMERCIAL

## 2024-09-03 VITALS
WEIGHT: 134 LBS | BODY MASS INDEX: 18.76 KG/M2 | DIASTOLIC BLOOD PRESSURE: 82 MMHG | SYSTOLIC BLOOD PRESSURE: 130 MMHG | TEMPERATURE: 98.1 F | OXYGEN SATURATION: 100 % | HEART RATE: 70 BPM | HEIGHT: 71 IN

## 2024-09-03 DIAGNOSIS — Z78.9 ALCOHOL USE: ICD-10-CM

## 2024-09-03 DIAGNOSIS — K86.81 EXOCRINE PANCREATIC INSUFFICIENCY: ICD-10-CM

## 2024-09-03 DIAGNOSIS — D64.9 MILD ANEMIA: ICD-10-CM

## 2024-09-03 DIAGNOSIS — E87.1 HYPONATREMIA: ICD-10-CM

## 2024-09-03 DIAGNOSIS — R79.89 ABNORMAL LFTS: ICD-10-CM

## 2024-09-03 DIAGNOSIS — F17.200 TOBACCO USE DISORDER: ICD-10-CM

## 2024-09-03 DIAGNOSIS — R79.89 ABNORMAL LFTS: Primary | ICD-10-CM

## 2024-09-03 PROCEDURE — 99214 OFFICE O/P EST MOD 30 MIN: CPT | Performed by: INTERNAL MEDICINE

## 2024-09-03 RX ORDER — DULOXETIN HYDROCHLORIDE 60 MG/1
60 CAPSULE, DELAYED RELEASE ORAL NIGHTLY
Qty: 30 CAPSULE | Refills: 3 | Status: SHIPPED | OUTPATIENT
Start: 2024-09-03

## 2024-09-03 RX ORDER — FERROUS SULFATE 325(65) MG
325 TABLET ORAL 2 TIMES DAILY
Qty: 180 TABLET | Refills: 1 | Status: SHIPPED | OUTPATIENT
Start: 2024-09-03

## 2024-09-03 RX ORDER — PANCRELIPASE 60000; 12000; 38000 [USP'U]/1; [USP'U]/1; [USP'U]/1
CAPSULE, DELAYED RELEASE PELLETS ORAL
Qty: 270 CAPSULE | Refills: 1 | Status: SHIPPED | OUTPATIENT
Start: 2024-09-03

## 2024-09-03 RX ORDER — LABETALOL 200 MG/1
200 TABLET, FILM COATED ORAL 2 TIMES DAILY
Qty: 180 TABLET | Refills: 1 | Status: SHIPPED | OUTPATIENT
Start: 2024-09-03

## 2024-09-03 RX ORDER — DULOXETIN HYDROCHLORIDE 60 MG/1
60 CAPSULE, DELAYED RELEASE ORAL DAILY
Qty: 30 CAPSULE | Refills: 0 | Status: CANCELLED | OUTPATIENT
Start: 2024-09-03

## 2024-09-03 RX ORDER — BUDESONIDE AND FORMOTEROL FUMARATE DIHYDRATE 160; 4.5 UG/1; UG/1
2 AEROSOL RESPIRATORY (INHALATION) 2 TIMES DAILY
Qty: 30.6 G | Refills: 1 | Status: SHIPPED | OUTPATIENT
Start: 2024-09-03

## 2024-09-03 RX ORDER — ALBUTEROL SULFATE 90 UG/1
2 AEROSOL, METERED RESPIRATORY (INHALATION) 4 TIMES DAILY PRN
Qty: 54 G | Refills: 1 | Status: SHIPPED | OUTPATIENT
Start: 2024-09-03

## 2024-09-03 SDOH — ECONOMIC STABILITY: FOOD INSECURITY: WITHIN THE PAST 12 MONTHS, THE FOOD YOU BOUGHT JUST DIDN'T LAST AND YOU DIDN'T HAVE MONEY TO GET MORE.: NEVER TRUE

## 2024-09-03 SDOH — ECONOMIC STABILITY: FOOD INSECURITY: WITHIN THE PAST 12 MONTHS, YOU WORRIED THAT YOUR FOOD WOULD RUN OUT BEFORE YOU GOT MONEY TO BUY MORE.: NEVER TRUE

## 2024-09-03 SDOH — ECONOMIC STABILITY: INCOME INSECURITY: HOW HARD IS IT FOR YOU TO PAY FOR THE VERY BASICS LIKE FOOD, HOUSING, MEDICAL CARE, AND HEATING?: NOT HARD AT ALL

## 2024-09-03 ASSESSMENT — ENCOUNTER SYMPTOMS
DIARRHEA: 0
SHORTNESS OF BREATH: 0
VOMITING: 0
ABDOMINAL PAIN: 0
COUGH: 0
NAUSEA: 0

## 2024-09-03 NOTE — PROGRESS NOTES
[facility]  [address], [place] [address]  Phone: [phone]      Star Noyola (: 1965) is a 59 y.o. male, established patient, here for evaluation of the following chief complaint(s):  Follow-up Chronic Condition, Hypertension, and Discuss Labs     SUBJECTIVE/OBJECTIVE:  History of Present Illness  The patient is a 59-year-old male with a past medical history of hypertension, hyponatremia, emphysema, alcohol use, and abnormal liver function tests, seen today for a follow-up of blood pressure and to review his blood test done on 2024. He is accompanied by his jerardo Gonzalez.    He continues to consume alcohol and smoke, despite being advised against it. He lives with a friend who also consumes alcohol.    His medication regimen includes labetalol, Creon, duloxetine, iron tablets, Keppra, and pramipexole. He uses an albuterol inhaler and requests a refill.    He reports an improvement in his diarrhea symptoms since he is on Cren. He has not sought consultation from a hepatologist.     Has some red  spots on his forearms.He reports no recent falls or injuries that could have caused bruising. He also reports no bleeding during tooth brushing, nosebleeds, or blood in urine. However, he does report difficulty in urinating.    He underwent a colonoscopy last year.        No results found for: \"LABA1C\"   Hemoglobin   Date Value Ref Range Status   2024 13.1 13.0 - 17.7 g/dL Final     Hematocrit   Date Value Ref Range Status   2024 36.8 (L) 37.5 - 51.0 % Final     Creatinine   Date Value Ref Range Status   2024 0.77 0.76 - 1.27 mg/dL Final     Glucose   Date Value Ref Range Status   2024 90 70 - 99 mg/dL Final     TSH   Date Value Ref Range Status   2023 1.610 0.450 - 4.500 uIU/mL Final     Potassium   Date Value Ref Range Status   2024 5.2 3.5 - 5.2 mmol/L Final     Cholesterol, Total   Date Value Ref Range Status

## 2024-09-03 NOTE — PROGRESS NOTES
.  Chief Complaint   Patient presents with    Follow-up Chronic Condition    Hypertension    Discuss Labs     .  \"Have you been to the ER, urgent care clinic since your last visit?  Hospitalized since your last visit?\"    NO    “Have you seen or consulted any other health care providers outside of [facility] since your last visit?”    NO      ./82 (Site: Left Upper Arm, Position: Sitting, Cuff Size: Medium Adult)   Pulse 70   Temp 98.1 °F (36.7 °C) (Oral)   Ht 1.803 m (5' 11\")   Wt 60.8 kg (134 lb)   SpO2 100%   BMI 18.69 kg/m²         Click Here for Release of Records Request

## 2024-10-15 ENCOUNTER — HOSPITAL ENCOUNTER (EMERGENCY)
Facility: HOSPITAL | Age: 59
Discharge: HOME | End: 2024-10-15
Attending: EMERGENCY MEDICINE
Payer: COMMERCIAL

## 2024-10-15 VITALS
HEIGHT: 72 IN | TEMPERATURE: 98.1 F | HEART RATE: 99 BPM | DIASTOLIC BLOOD PRESSURE: 82 MMHG | SYSTOLIC BLOOD PRESSURE: 120 MMHG | RESPIRATION RATE: 16 BRPM | OXYGEN SATURATION: 100 % | WEIGHT: 140 LBS | BODY MASS INDEX: 18.96 KG/M2

## 2024-10-15 DIAGNOSIS — N39.43 POST-VOID DRIBBLING: Primary | ICD-10-CM

## 2024-10-15 LAB
APPEARANCE UR: CLEAR
BACTERIA URNS QL MICRO: NEGATIVE /HPF
BILIRUB UR QL: NEGATIVE
COLOR UR: ABNORMAL
EPITH CASTS URNS QL MICRO: ABNORMAL /LPF
GLUCOSE UR STRIP.AUTO-MCNC: NEGATIVE MG/DL
HGB UR QL STRIP: NEGATIVE
KETONES UR QL STRIP.AUTO: NEGATIVE MG/DL
LEUKOCYTE ESTERASE UR QL STRIP.AUTO: NEGATIVE
NITRITE UR QL STRIP.AUTO: NEGATIVE
PH UR STRIP: 6.5 (ref 5–8)
PROT UR STRIP-MCNC: NEGATIVE MG/DL
RBC #/AREA URNS HPF: ABNORMAL /HPF (ref 0–5)
SP GR UR REFRACTOMETRY: 1 (ref 1–1.03)
URINE CULTURE IF INDICATED: ABNORMAL
UROBILINOGEN UR QL STRIP.AUTO: 0.1 EU/DL (ref 0.2–1)
WBC URNS QL MICRO: ABNORMAL /HPF (ref 0–4)

## 2024-10-15 PROCEDURE — 99283 EMERGENCY DEPT VISIT LOW MDM: CPT

## 2024-10-15 PROCEDURE — 81001 URINALYSIS AUTO W/SCOPE: CPT

## 2024-10-15 ASSESSMENT — PAIN - FUNCTIONAL ASSESSMENT
PAIN_FUNCTIONAL_ASSESSMENT: NONE - DENIES PAIN
PAIN_FUNCTIONAL_ASSESSMENT: NONE - DENIES PAIN

## 2024-10-15 NOTE — ED PROVIDER NOTES
film images such as CT, Ultrasound and MRI are read by the radiologist. Plain radiographic images are visualized and preliminarily interpreted by the ED Provider with the following findings: Not Applicable.    Interpretation per the Radiologist below, if available at the time of this note:  No orders to display        ED COURSE and DIFFERENTIAL DIAGNOSIS/MDM   4:26 PM Differential and Considerations: Patient presenting for some urinary incontinence over the past couple of months.  Differential includes neurogenic bladder, BPH and prostate issues, urinary retention, UTI.  Will start off with urinalysis.  Likely something that will need urology to follow-up.    Records Reviewed (source and summary of external notes): Prior medical records and Nursing notes.    Vitals:    Vitals:    10/15/24 1538   BP: 120/82   Pulse: 99   Resp: 16   Temp: 98.1 °F (36.7 °C)   SpO2: 100%   Weight: 63.5 kg (140 lb)   Height: 1.829 m (6')        ED COURSE  ED Course as of 10/15/24 1626   Tue Oct 15, 2024   1558 Patient was able to completely empty his bladder.  Made 400 mL of urine.  Pending urinalysis. [JS]      ED Course User Index  [JS] Froylan Moran MD       SEPSIS Reassessment: Sepsis reassessment not applicable    Clinical Management Tools:  Not Applicable    Patient was given the following medications:  Medications - No data to display    CONSULTS: See ED Course/MDM for further details.  None     Social Determinants affecting Diagnosis/Treatment: None    Smoking Cessation: Not Applicable    PROCEDURES   Unless otherwise noted above, none  Procedures      CRITICAL CARE TIME   Patient does not meet Critical Care Time, 0 minutes    ED IMPRESSION     1. Post-void dribbling          DISPOSITION/PLAN   DISPOSITION Decision To Discharge 10/15/2024 04:15:05 PM  Condition at Disposition: Data Unavailable    Discharge Note: The patient is stable for discharge home. The signs, symptoms, diagnosis, and discharge instructions have been

## 2024-10-15 NOTE — ED TRIAGE NOTES
Presents to ER with c/o urinary incontinence. States has been occurring intermittently for a few months but seems to be worsening. Denies back injury. Denies difficulty urinating. Pt speech is broken and strains to speak at time. States he has been evaluated by his PCP for this and believes it is secondary to smoking.

## 2024-10-18 LAB
ALBUMIN SERPL-MCNC: 4.2 G/DL (ref 3.8–4.9)
ALP SERPL-CCNC: 99 IU/L (ref 44–121)
ALT SERPL-CCNC: 36 IU/L (ref 0–44)
AST SERPL-CCNC: 111 IU/L (ref 0–40)
BASOPHILS # BLD AUTO: 0 X10E3/UL (ref 0–0.2)
BASOPHILS NFR BLD AUTO: 0 %
BILIRUB SERPL-MCNC: 0.2 MG/DL (ref 0–1.2)
BUN SERPL-MCNC: 6 MG/DL (ref 6–24)
BUN/CREAT SERPL: 8 (ref 9–20)
CALCIUM SERPL-MCNC: 9.2 MG/DL (ref 8.7–10.2)
CHLORIDE SERPL-SCNC: 103 MMOL/L (ref 96–106)
CO2 SERPL-SCNC: 19 MMOL/L (ref 20–29)
CREAT SERPL-MCNC: 0.8 MG/DL (ref 0.76–1.27)
EGFRCR SERPLBLD CKD-EPI 2021: 103 ML/MIN/1.73
EOSINOPHIL # BLD AUTO: 0.1 X10E3/UL (ref 0–0.4)
EOSINOPHIL NFR BLD AUTO: 1 %
ERYTHROCYTE [DISTWIDTH] IN BLOOD BY AUTOMATED COUNT: 12.8 % (ref 11.6–15.4)
GLOBULIN SER CALC-MCNC: 2.8 G/DL (ref 1.5–4.5)
GLUCOSE SERPL-MCNC: 81 MG/DL (ref 70–99)
HBV CORE AB SERPL QL IA: NEGATIVE
HBV SURFACE AB SER QL: NON REACTIVE
HBV SURFACE AG SERPL QL IA: NEGATIVE
HCT VFR BLD AUTO: 40.1 % (ref 37.5–51)
HCV IGG SERPL QL IA: NON REACTIVE
HGB BLD-MCNC: 13.6 G/DL (ref 13–17.7)
IMM GRANULOCYTES # BLD AUTO: 0 X10E3/UL (ref 0–0.1)
IMM GRANULOCYTES NFR BLD AUTO: 0 %
LYMPHOCYTES # BLD AUTO: 2 X10E3/UL (ref 0.7–3.1)
LYMPHOCYTES NFR BLD AUTO: 28 %
MCH RBC QN AUTO: 36.6 PG (ref 26.6–33)
MCHC RBC AUTO-ENTMCNC: 33.9 G/DL (ref 31.5–35.7)
MCV RBC AUTO: 108 FL (ref 79–97)
MONOCYTES # BLD AUTO: 0.8 X10E3/UL (ref 0.1–0.9)
MONOCYTES NFR BLD AUTO: 11 %
NEUTROPHILS # BLD AUTO: 4.2 X10E3/UL (ref 1.4–7)
NEUTROPHILS NFR BLD AUTO: 60 %
PLATELET # BLD AUTO: 189 X10E3/UL (ref 150–450)
POTASSIUM SERPL-SCNC: 4.6 MMOL/L (ref 3.5–5.2)
PROT SERPL-MCNC: 7 G/DL (ref 6–8.5)
RBC # BLD AUTO: 3.72 X10E6/UL (ref 4.14–5.8)
SODIUM SERPL-SCNC: 138 MMOL/L (ref 134–144)
WBC # BLD AUTO: 7 X10E3/UL (ref 3.4–10.8)

## 2024-10-24 PROBLEM — R39.198 DIFFICULTY VOIDING: Status: ACTIVE | Noted: 2024-10-24

## 2024-10-25 ENCOUNTER — OFFICE VISIT (OUTPATIENT)
Age: 59
End: 2024-10-25
Payer: COMMERCIAL

## 2024-10-25 VITALS
HEART RATE: 111 BPM | DIASTOLIC BLOOD PRESSURE: 79 MMHG | WEIGHT: 140 LBS | HEIGHT: 72 IN | SYSTOLIC BLOOD PRESSURE: 117 MMHG | BODY MASS INDEX: 18.96 KG/M2

## 2024-10-25 DIAGNOSIS — R39.12 BENIGN PROSTATIC HYPERPLASIA WITH WEAK URINARY STREAM: ICD-10-CM

## 2024-10-25 DIAGNOSIS — N52.9 ERECTILE DYSFUNCTION, UNSPECIFIED ERECTILE DYSFUNCTION TYPE: Primary | ICD-10-CM

## 2024-10-25 DIAGNOSIS — N40.1 BENIGN PROSTATIC HYPERPLASIA WITH WEAK URINARY STREAM: ICD-10-CM

## 2024-10-25 LAB
BILIRUBIN, URINE, POC: NEGATIVE
BLOOD URINE, POC: NEGATIVE
GLUCOSE URINE, POC: NEGATIVE
KETONES, URINE, POC: NEGATIVE
LEUKOCYTE ESTERASE, URINE, POC: NEGATIVE
NITRITE, URINE, POC: NEGATIVE
PH, URINE, POC: 7 (ref 4.6–8)
PROTEIN,URINE, POC: NEGATIVE
SPECIFIC GRAVITY, URINE, POC: 1.01 (ref 1–1.03)
URINALYSIS CLARITY, POC: CLEAR
URINALYSIS COLOR, POC: YELLOW
UROBILINOGEN, POC: NORMAL

## 2024-10-25 PROCEDURE — 99204 OFFICE O/P NEW MOD 45 MIN: CPT | Performed by: STUDENT IN AN ORGANIZED HEALTH CARE EDUCATION/TRAINING PROGRAM

## 2024-10-25 PROCEDURE — 81003 URINALYSIS AUTO W/O SCOPE: CPT | Performed by: STUDENT IN AN ORGANIZED HEALTH CARE EDUCATION/TRAINING PROGRAM

## 2024-10-25 RX ORDER — TADALAFIL 20 MG/1
20 TABLET ORAL DAILY PRN
Qty: 10 TABLET | Refills: 2 | Status: SHIPPED | OUTPATIENT
Start: 2024-10-25 | End: 2024-11-24

## 2024-10-25 RX ORDER — TAMSULOSIN HYDROCHLORIDE 0.4 MG/1
0.4 CAPSULE ORAL DAILY
Qty: 90 CAPSULE | Refills: 1 | Status: SHIPPED | OUTPATIENT
Start: 2024-10-25

## 2024-10-25 NOTE — PROGRESS NOTES
HISTORY OF PRESENT ILLNESS  Star Noyola is a 59 y.o. male.  Chief Complaint   Patient presents with    New Patient     Difficulty urinating with no pain       59-year-old male referred for issues with urinating.  He has significant urge to go to the bathroom however when he goes he has a very weak stream.  He also has issues with erectile dysfunction and has never used any medication for this.  He states he is not able to achieve or maintain erections at this point.  He has no other urologic issues.        PAST MEDICAL HISTORY:  PMHx (including negatives):  has a past medical history of Alcohol abuse, COPD (chronic obstructive pulmonary disease) ([facility]), Fracture of bone of left shoulder, Hypertension, Polysubstance abuse (HCC), and Seizures (HCC).   PSurgHx:  has a past surgical history that includes Colonoscopy (N/A, 2/13/2023) and orthopedic surgery.  PSocHx:  reports that he has been smoking cigarettes. He has a 10 pack-year smoking history. He has never used smokeless tobacco. He reports current alcohol use of about 2.0 standard drinks of alcohol per week. He reports that he does not use drugs.     Review of Systems      Physical Exam  Constitutional:       General: He is not in acute distress.     Appearance: Normal appearance. He is not ill-appearing.   HENT:      Head: Normocephalic and atraumatic.      Nose: Nose normal.   Eyes:      Extraocular Movements: Extraocular movements intact.      Pupils: Pupils are equal, round, and reactive to light.   Pulmonary:      Effort: Pulmonary effort is normal. No respiratory distress.   Abdominal:      General: There is no distension.   Genitourinary:     Penis: Normal.       Testes: Normal.   Musculoskeletal:         General: No deformity.      Cervical back: Normal range of motion.   Skin:     Coloration: Skin is not jaundiced or pale.   Neurological:      General: No focal deficit present.      Mental Status: He is alert and oriented to person, place, and time.

## 2024-10-25 NOTE — PROGRESS NOTES
Chief Complaint   Patient presents with    New Patient     Difficulty urinating with no pain     1. Have you been to the ER, urgent care clinic since your last visit?  Hospitalized since your last visit? Yes Post void dribbling    2. Have you seen or consulted any other health care providers outside of the [facility] since your last visit?  Include any pap smears or colon screening.  Colonoscopy last year  /79   Pulse (!) 111   Ht 1.829 m (6')   Wt 63.5 kg (140 lb)   BMI 18.99 kg/m²

## 2024-11-13 DIAGNOSIS — N52.9 ERECTILE DYSFUNCTION, UNSPECIFIED ERECTILE DYSFUNCTION TYPE: ICD-10-CM

## 2024-11-13 DIAGNOSIS — N40.1 BENIGN PROSTATIC HYPERPLASIA WITH WEAK URINARY STREAM: ICD-10-CM

## 2024-11-13 DIAGNOSIS — R39.12 BENIGN PROSTATIC HYPERPLASIA WITH WEAK URINARY STREAM: ICD-10-CM

## 2024-11-20 ENCOUNTER — OFFICE VISIT (OUTPATIENT)
Facility: CLINIC | Age: 59
End: 2024-11-20
Payer: MEDICARE

## 2024-11-20 VITALS
HEIGHT: 72 IN | HEART RATE: 125 BPM | SYSTOLIC BLOOD PRESSURE: 124 MMHG | WEIGHT: 129 LBS | BODY MASS INDEX: 17.47 KG/M2 | OXYGEN SATURATION: 100 % | DIASTOLIC BLOOD PRESSURE: 78 MMHG | RESPIRATION RATE: 16 BRPM

## 2024-11-20 DIAGNOSIS — N40.1 BENIGN PROSTATIC HYPERPLASIA WITH WEAK URINARY STREAM: ICD-10-CM

## 2024-11-20 DIAGNOSIS — F19.10 POLYSUBSTANCE ABUSE (HCC): ICD-10-CM

## 2024-11-20 DIAGNOSIS — R39.12 BENIGN PROSTATIC HYPERPLASIA WITH WEAK URINARY STREAM: ICD-10-CM

## 2024-11-20 DIAGNOSIS — J43.8 OTHER EMPHYSEMA (HCC): ICD-10-CM

## 2024-11-20 DIAGNOSIS — D64.9 MILD ANEMIA: ICD-10-CM

## 2024-11-20 DIAGNOSIS — R79.89 ABNORMAL LFTS: ICD-10-CM

## 2024-11-20 DIAGNOSIS — I10 ESSENTIAL HYPERTENSION, BENIGN: Primary | ICD-10-CM

## 2024-11-20 DIAGNOSIS — I10 ESSENTIAL HYPERTENSION, BENIGN: ICD-10-CM

## 2024-11-20 DIAGNOSIS — G40.802 OTHER EPILEPSY WITHOUT STATUS EPILEPTICUS, NOT INTRACTABLE (HCC): ICD-10-CM

## 2024-11-20 PROCEDURE — 3017F COLORECTAL CA SCREEN DOC REV: CPT | Performed by: INTERNAL MEDICINE

## 2024-11-20 PROCEDURE — 3023F SPIROM DOC REV: CPT | Performed by: INTERNAL MEDICINE

## 2024-11-20 PROCEDURE — G8484 FLU IMMUNIZE NO ADMIN: HCPCS | Performed by: INTERNAL MEDICINE

## 2024-11-20 PROCEDURE — 3074F SYST BP LT 130 MM HG: CPT | Performed by: INTERNAL MEDICINE

## 2024-11-20 PROCEDURE — 99214 OFFICE O/P EST MOD 30 MIN: CPT | Performed by: INTERNAL MEDICINE

## 2024-11-20 PROCEDURE — 3078F DIAST BP <80 MM HG: CPT | Performed by: INTERNAL MEDICINE

## 2024-11-20 PROCEDURE — G8427 DOCREV CUR MEDS BY ELIG CLIN: HCPCS | Performed by: INTERNAL MEDICINE

## 2024-11-20 PROCEDURE — G8419 CALC BMI OUT NRM PARAM NOF/U: HCPCS | Performed by: INTERNAL MEDICINE

## 2024-11-20 PROCEDURE — 4004F PT TOBACCO SCREEN RCVD TLK: CPT | Performed by: INTERNAL MEDICINE

## 2024-11-20 RX ORDER — TADALAFIL 20 MG/1
TABLET ORAL
Qty: 90 TABLET | Refills: 2 | OUTPATIENT
Start: 2024-11-20

## 2024-11-20 RX ORDER — TAMSULOSIN HYDROCHLORIDE 0.4 MG/1
0.4 CAPSULE ORAL DAILY
Qty: 90 CAPSULE | Refills: 1 | Status: SHIPPED | OUTPATIENT
Start: 2024-11-20

## 2024-11-20 RX ORDER — FLUTICASONE PROPIONATE 50 MCG
1 SPRAY, SUSPENSION (ML) NASAL DAILY
Qty: 32 G | Refills: 1 | Status: SHIPPED | OUTPATIENT
Start: 2024-11-20

## 2024-11-20 RX ORDER — TOPIRAMATE 25 MG/1
25 TABLET, FILM COATED ORAL 2 TIMES DAILY
Qty: 60 TABLET | Status: CANCELLED | OUTPATIENT
Start: 2024-11-20

## 2024-11-20 RX ORDER — LABETALOL 100 MG/1
100 TABLET, FILM COATED ORAL 2 TIMES DAILY
Qty: 180 TABLET | Refills: 1 | Status: SHIPPED | OUTPATIENT
Start: 2024-11-20

## 2024-11-20 ASSESSMENT — PATIENT HEALTH QUESTIONNAIRE - PHQ9
SUM OF ALL RESPONSES TO PHQ QUESTIONS 1-9: 15
4. FEELING TIRED OR HAVING LITTLE ENERGY: NEARLY EVERY DAY
SUM OF ALL RESPONSES TO PHQ QUESTIONS 1-9: 15
9. THOUGHTS THAT YOU WOULD BE BETTER OFF DEAD, OR OF HURTING YOURSELF: NOT AT ALL
3. TROUBLE FALLING OR STAYING ASLEEP: NEARLY EVERY DAY
1. LITTLE INTEREST OR PLEASURE IN DOING THINGS: NOT AT ALL
7. TROUBLE CONCENTRATING ON THINGS, SUCH AS READING THE NEWSPAPER OR WATCHING TELEVISION: NEARLY EVERY DAY
8. MOVING OR SPEAKING SO SLOWLY THAT OTHER PEOPLE COULD HAVE NOTICED. OR THE OPPOSITE, BEING SO FIGETY OR RESTLESS THAT YOU HAVE BEEN MOVING AROUND A LOT MORE THAN USUAL: NOT AT ALL
6. FEELING BAD ABOUT YOURSELF - OR THAT YOU ARE A FAILURE OR HAVE LET YOURSELF OR YOUR FAMILY DOWN: NOT AT ALL
5. POOR APPETITE OR OVEREATING: NEARLY EVERY DAY
SUM OF ALL RESPONSES TO PHQ QUESTIONS 1-9: 15
SUM OF ALL RESPONSES TO PHQ QUESTIONS 1-9: 15
2. FEELING DOWN, DEPRESSED OR HOPELESS: NEARLY EVERY DAY
10. IF YOU CHECKED OFF ANY PROBLEMS, HOW DIFFICULT HAVE THESE PROBLEMS MADE IT FOR YOU TO DO YOUR WORK, TAKE CARE OF THINGS AT HOME, OR GET ALONG WITH OTHER PEOPLE: VERY DIFFICULT
SUM OF ALL RESPONSES TO PHQ9 QUESTIONS 1 & 2: 3

## 2024-11-20 NOTE — PROGRESS NOTES
Chief Complaint   Patient presents with    Follow-up     Abnormal LFTs     1. Have you been to the ER, urgent care clinic since your last visit?  Hospitalized since your last visit? Yes Post voiding dribble    2. Have you seen or consulted any other health care providers outside of the [facility] since your last visit?  Include any pap smears or colon screening. No  /78 (Site: Right Upper Arm, Position: Sitting, Cuff Size: Medium Adult)   Pulse (!) 125   Resp 16   Ht 1.829 m (6')   Wt 58.5 kg (129 lb)   SpO2 100%   BMI 17.50 kg/m²     
Negative for chest pain and leg swelling.   Gastrointestinal:  Negative for abdominal pain, diarrhea, nausea and vomiting.   Genitourinary:  Negative for dysuria.   Skin:  Negative for rash.   Neurological:  Negative for tremors and headaches.   Psychiatric/Behavioral:  The patient is not nervous/anxious.        /78 (Site: Right Upper Arm, Position: Sitting, Cuff Size: Medium Adult)   Pulse (!) 125   Resp 16   Ht 1.829 m (6')   Wt 58.5 kg (129 lb)   SpO2 100%   BMI 17.50 kg/m²      Physical Exam  Constitutional:       No acute distress noted.  Young thin built the patient  HENT:      Head: Normocephalic and atraumatic.      Right Ear: External ear normal.      Left Ear: External ear normal.      Nose: Nose normal.      Mouth/Throat:      Mouth: Mucous membranes are moist.   Eyes:      Extraocular Movements: Extraocular movements intact.      Pupils: Pupils are equal, round, and reactive to light.   Cardiovascular:      Rate and Rhythm: Normal rate and regular rhythm.   Pulmonary:      Effort: Pulmonary effort is normal.      Breath sounds: Normal breath sounds.   Abdominal:      Palpations: Abdomen is soft.      Tenderness: There is no abdominal tenderness.   Musculoskeletal:      Cervical back: Normal range of motion and neck supple.      Right lower leg: No edema.      Left lower leg: No edema.   Skin:     General: Skin is warm and dry.   Neurological:      General: No focal deficit present.      Mental Status: He   is alert and oriented to person, place, and time.   Psychiatric:         Mood and Affect: Mood normal.    ASSESSMENT/PLAN:  Below is the assessment and plan developed based on review of pertinent history, physical exam, labs, studies, and medications.  Assessment & Plan      1. Essential hypertension, benign  Comments:  Blood pressure is controlled, will change labetalol  to 100 mg twice a day and will monitor  Orders:  -     TSH; Future  2. Abnormal LFTs  Comments:  ALT normal at 36 AST

## 2024-11-21 ASSESSMENT — ENCOUNTER SYMPTOMS
ABDOMINAL PAIN: 0
SHORTNESS OF BREATH: 0
DIARRHEA: 0
VOMITING: 0
COUGH: 0
NAUSEA: 0

## 2024-11-23 RX ORDER — DULOXETIN HYDROCHLORIDE 60 MG/1
60 CAPSULE, DELAYED RELEASE ORAL NIGHTLY
Qty: 90 CAPSULE | Refills: 1 | Status: SHIPPED | OUTPATIENT
Start: 2024-11-23

## 2024-11-25 DIAGNOSIS — N40.1 BENIGN PROSTATIC HYPERPLASIA WITH WEAK URINARY STREAM: ICD-10-CM

## 2024-11-25 DIAGNOSIS — N52.9 ERECTILE DYSFUNCTION, UNSPECIFIED ERECTILE DYSFUNCTION TYPE: ICD-10-CM

## 2024-11-25 DIAGNOSIS — R39.12 BENIGN PROSTATIC HYPERPLASIA WITH WEAK URINARY STREAM: ICD-10-CM

## 2024-12-10 ENCOUNTER — OFFICE VISIT (OUTPATIENT)
Age: 59
End: 2024-12-10
Payer: MEDICARE

## 2024-12-10 VITALS
SYSTOLIC BLOOD PRESSURE: 117 MMHG | HEIGHT: 72 IN | OXYGEN SATURATION: 98 % | HEART RATE: 85 BPM | RESPIRATION RATE: 18 BRPM | BODY MASS INDEX: 17.5 KG/M2 | DIASTOLIC BLOOD PRESSURE: 74 MMHG

## 2024-12-10 DIAGNOSIS — N52.9 ERECTILE DYSFUNCTION, UNSPECIFIED ERECTILE DYSFUNCTION TYPE: ICD-10-CM

## 2024-12-10 DIAGNOSIS — N40.1 BENIGN PROSTATIC HYPERPLASIA WITH WEAK URINARY STREAM: ICD-10-CM

## 2024-12-10 DIAGNOSIS — R39.12 BENIGN PROSTATIC HYPERPLASIA WITH WEAK URINARY STREAM: ICD-10-CM

## 2024-12-10 PROCEDURE — G8484 FLU IMMUNIZE NO ADMIN: HCPCS | Performed by: STUDENT IN AN ORGANIZED HEALTH CARE EDUCATION/TRAINING PROGRAM

## 2024-12-10 PROCEDURE — 99214 OFFICE O/P EST MOD 30 MIN: CPT | Performed by: STUDENT IN AN ORGANIZED HEALTH CARE EDUCATION/TRAINING PROGRAM

## 2024-12-10 PROCEDURE — G8427 DOCREV CUR MEDS BY ELIG CLIN: HCPCS | Performed by: STUDENT IN AN ORGANIZED HEALTH CARE EDUCATION/TRAINING PROGRAM

## 2024-12-10 PROCEDURE — 4004F PT TOBACCO SCREEN RCVD TLK: CPT | Performed by: STUDENT IN AN ORGANIZED HEALTH CARE EDUCATION/TRAINING PROGRAM

## 2024-12-10 PROCEDURE — 3017F COLORECTAL CA SCREEN DOC REV: CPT | Performed by: STUDENT IN AN ORGANIZED HEALTH CARE EDUCATION/TRAINING PROGRAM

## 2024-12-10 PROCEDURE — G8419 CALC BMI OUT NRM PARAM NOF/U: HCPCS | Performed by: STUDENT IN AN ORGANIZED HEALTH CARE EDUCATION/TRAINING PROGRAM

## 2024-12-10 RX ORDER — TAMSULOSIN HYDROCHLORIDE 0.4 MG/1
0.4 CAPSULE ORAL DAILY
Qty: 90 CAPSULE | Refills: 1 | Status: SHIPPED | OUTPATIENT
Start: 2024-12-10

## 2024-12-10 RX ORDER — TADALAFIL 20 MG/1
TABLET ORAL
Qty: 90 TABLET | Refills: 2 | Status: SHIPPED | OUTPATIENT
Start: 2024-12-10

## 2024-12-10 RX ORDER — TADALAFIL 20 MG/1
20 TABLET ORAL DAILY PRN
Qty: 10 TABLET | Refills: 2 | Status: SHIPPED | OUTPATIENT
Start: 2024-12-10 | End: 2024-12-10

## 2024-12-10 RX ORDER — TADALAFIL 20 MG/1
TABLET ORAL
Qty: 10 TABLET | Refills: 2 | OUTPATIENT
Start: 2024-12-10

## 2024-12-10 NOTE — ASSESSMENT & PLAN NOTE
Chronic, stable.  Symptoms managed well on Flomax 0.4 mg daily.  Continue.  Follow-up in 3 months to review symptoms.

## 2024-12-10 NOTE — PROGRESS NOTES
HISTORY OF PRESENT ILLNESS  Star Noyola is a 59 y.o. male.  Chief Complaint   Patient presents with    Follow-up     6 week follow-up       59-year-old male seen for follow-up of BPH and erectile dysfunction.  Started previously on Flomax 0.4 mg daily and has had excellent response to the medication.  His urinary symptoms are now satisfactory.  He was also started on 20 mg tadalafil as needed for erectile dysfunction.  He has noticed an improved ability to achieve firm erections.  He continues to have issues with maintaining erections firm enough for completion of sexual activity.        PAST MEDICAL HISTORY:  PMHx (including negatives):  has a past medical history of Alcohol abuse, COPD (chronic obstructive pulmonary disease) (HCC), Fracture of bone of left shoulder, Hypertension, Polysubstance abuse (HCC), and Seizures (HCC).   PSurgHx:  has a past surgical history that includes Colonoscopy (N/A, 2/13/2023) and orthopedic surgery.  PSocHx:  reports that he has been smoking cigarettes. He has a 10 pack-year smoking history. He has never used smokeless tobacco. He reports current alcohol use of about 2.0 standard drinks of alcohol per week. He reports that he does not use drugs.     Review of Systems      Physical Exam  Constitutional:       General: He is not in acute distress.     Appearance: Normal appearance. He is not ill-appearing.   HENT:      Head: Normocephalic and atraumatic.      Nose: Nose normal.   Eyes:      Extraocular Movements: Extraocular movements intact.      Pupils: Pupils are equal, round, and reactive to light.   Pulmonary:      Effort: Pulmonary effort is normal. No respiratory distress.   Abdominal:      General: There is no distension.   Musculoskeletal:         General: No deformity.      Cervical back: Normal range of motion.   Skin:     Coloration: Skin is not jaundiced or pale.   Neurological:      General: No focal deficit present.      Mental Status: He is alert and oriented to

## 2024-12-10 NOTE — ASSESSMENT & PLAN NOTE
Chronic, not at goal.  Patient is able to achieve firm erections with 20 mg tadalafil but does not able to maintain erections firm enough for completion of sexual activity.  Recommended continue taking 20 mg tadalafil as needed for erectile dysfunction and add an external penile pump with a penile constriction band.  Follow-up in 3 months to review symptoms.

## 2024-12-10 NOTE — PROGRESS NOTES
1. Have you been to the ER, urgent care clinic since your last visit?  Hospitalized since your last visit?no    2. Have you seen or consulted any other health care providers outside of the [facility] since your last visit?  Include any pap smears or colon screening. no    Vitals:    12/10/24 1203   BP: 117/74   Pulse: 85   Resp: 18   SpO2: 98%

## 2024-12-17 LAB
ALBUMIN SERPL-MCNC: 4 G/DL (ref 3.8–4.9)
ALP SERPL-CCNC: 76 IU/L (ref 44–121)
ALT SERPL-CCNC: 24 IU/L (ref 0–44)
AST SERPL-CCNC: 64 IU/L (ref 0–40)
BILIRUB SERPL-MCNC: 0.4 MG/DL (ref 0–1.2)
BUN SERPL-MCNC: 10 MG/DL (ref 6–24)
BUN/CREAT SERPL: 14 (ref 9–20)
CALCIUM SERPL-MCNC: 8.8 MG/DL (ref 8.7–10.2)
CHLORIDE SERPL-SCNC: 96 MMOL/L (ref 96–106)
CO2 SERPL-SCNC: 21 MMOL/L (ref 20–29)
CREAT SERPL-MCNC: 0.71 MG/DL (ref 0.76–1.27)
EGFRCR SERPLBLD CKD-EPI 2021: 107 ML/MIN/1.73
GLOBULIN SER CALC-MCNC: 2.4 G/DL (ref 1.5–4.5)
GLUCOSE SERPL-MCNC: 66 MG/DL (ref 70–99)
POTASSIUM SERPL-SCNC: 4.2 MMOL/L (ref 3.5–5.2)
PROT SERPL-MCNC: 6.4 G/DL (ref 6–8.5)
SODIUM SERPL-SCNC: 134 MMOL/L (ref 134–144)
TSH SERPL DL<=0.005 MIU/L-ACNC: 1.53 UIU/ML (ref 0.45–4.5)

## 2025-01-27 RX ORDER — ALBUTEROL SULFATE 90 UG/1
INHALANT RESPIRATORY (INHALATION)
Qty: 25.5 G | Refills: 0 | Status: SHIPPED | OUTPATIENT
Start: 2025-01-27

## 2025-03-10 ENCOUNTER — OFFICE VISIT (OUTPATIENT)
Age: 60
End: 2025-03-10
Payer: MEDICARE

## 2025-03-10 VITALS
HEIGHT: 72 IN | WEIGHT: 129 LBS | BODY MASS INDEX: 17.47 KG/M2 | HEART RATE: 96 BPM | DIASTOLIC BLOOD PRESSURE: 97 MMHG | SYSTOLIC BLOOD PRESSURE: 155 MMHG

## 2025-03-10 DIAGNOSIS — N52.9 VASCULOGENIC ERECTILE DYSFUNCTION, UNSPECIFIED VASCULOGENIC ERECTILE DYSFUNCTION TYPE: ICD-10-CM

## 2025-03-10 DIAGNOSIS — R39.12 BENIGN PROSTATIC HYPERPLASIA WITH WEAK URINARY STREAM: Primary | ICD-10-CM

## 2025-03-10 DIAGNOSIS — N40.1 BENIGN PROSTATIC HYPERPLASIA WITH WEAK URINARY STREAM: Primary | ICD-10-CM

## 2025-03-10 PROCEDURE — G8427 DOCREV CUR MEDS BY ELIG CLIN: HCPCS | Performed by: STUDENT IN AN ORGANIZED HEALTH CARE EDUCATION/TRAINING PROGRAM

## 2025-03-10 PROCEDURE — G8419 CALC BMI OUT NRM PARAM NOF/U: HCPCS | Performed by: STUDENT IN AN ORGANIZED HEALTH CARE EDUCATION/TRAINING PROGRAM

## 2025-03-10 PROCEDURE — 4004F PT TOBACCO SCREEN RCVD TLK: CPT | Performed by: STUDENT IN AN ORGANIZED HEALTH CARE EDUCATION/TRAINING PROGRAM

## 2025-03-10 PROCEDURE — 99214 OFFICE O/P EST MOD 30 MIN: CPT | Performed by: STUDENT IN AN ORGANIZED HEALTH CARE EDUCATION/TRAINING PROGRAM

## 2025-03-10 PROCEDURE — 3017F COLORECTAL CA SCREEN DOC REV: CPT | Performed by: STUDENT IN AN ORGANIZED HEALTH CARE EDUCATION/TRAINING PROGRAM

## 2025-03-10 NOTE — PROGRESS NOTES
Chief Complaint   Patient presents with    3 Month Follow-Up     1. Have you been to the ER, urgent care clinic since your last visit?  Hospitalized since your last visit?No    2. Have you seen or consulted any other health care providers outside of the [facility] since your last visit?  Include any pap smears or colon screening. No

## 2025-03-10 NOTE — PROGRESS NOTES
HISTORY OF PRESENT ILLNESS  Star Noyola is a 60 y.o. male.  Chief Complaint   Patient presents with    3 Month Follow-Up       60-year-old male with BPH on Flomax and erectile dysfunction on tadalafil with excellent control of symptoms.  No significant changes.  He is having retrograde ejaculation with Flomax but this is not overly bothersome to the patient.  He is currently satisfied with his treatment.        PAST MEDICAL HISTORY:  PMHx (including negatives):  has a past medical history of Alcohol abuse, COPD (chronic obstructive pulmonary disease) (HCC), Fracture of bone of left shoulder, Hypertension, Polysubstance abuse (HCC), and Seizures ([facility]).   PSurgHx:  has a past surgical history that includes Colonoscopy (N/A, 2/13/2023) and orthopedic surgery.  PSocHx:  reports that he has been smoking cigarettes. He has a 10 pack-year smoking history. He has never used smokeless tobacco. He reports current alcohol use of about 2.0 standard drinks of alcohol per week. He reports that he does not use drugs.     Review of Systems      Physical Exam  Constitutional:       General: He is not in acute distress.     Appearance: Normal appearance. He is not ill-appearing.   HENT:      Head: Normocephalic and atraumatic.      Nose: Nose normal.   Eyes:      Extraocular Movements: Extraocular movements intact.      Pupils: Pupils are equal, round, and reactive to light.   Pulmonary:      Effort: Pulmonary effort is normal. No respiratory distress.   Abdominal:      General: There is no distension.   Musculoskeletal:         General: No deformity.      Cervical back: Normal range of motion.   Skin:     Coloration: Skin is not jaundiced or pale.   Neurological:      General: No focal deficit present.      Mental Status: He is alert and oriented to person, place, and time.   Psychiatric:         Mood and Affect: Mood normal.         ASSESSMENT AND PLAN      1. Benign prostatic hyperplasia with weak urinary stream  Overview:  H/o

## 2025-03-17 PROBLEM — S42.92XA: Status: RESOLVED | Noted: 2023-02-02 | Resolved: 2025-03-17

## 2025-03-17 PROBLEM — S62.347A CLOSED NONDISPLACED FRACTURE OF BASE OF FIFTH METACARPAL BONE OF LEFT HAND: Status: RESOLVED | Noted: 2022-11-17 | Resolved: 2025-03-17

## 2025-03-17 PROBLEM — R39.198 DIFFICULTY VOIDING: Status: RESOLVED | Noted: 2024-10-24 | Resolved: 2025-03-17

## 2025-03-17 PROBLEM — S42.92XD: Status: RESOLVED | Noted: 2022-06-02 | Resolved: 2025-03-17

## 2025-03-17 PROBLEM — S42.352D CLOSED DISPLACED COMMINUTED FRACTURE OF SHAFT OF LEFT HUMERUS WITH ROUTINE HEALING: Status: RESOLVED | Noted: 2022-07-31 | Resolved: 2025-03-17

## 2025-03-17 PROBLEM — S42.92XA CLOSED FRACTURE DISLOCATION OF LEFT SHOULDER JOINT: Status: RESOLVED | Noted: 2023-05-02 | Resolved: 2025-03-17

## 2025-03-18 ENCOUNTER — TELEPHONE (OUTPATIENT)
Facility: CLINIC | Age: 60
End: 2025-03-18

## 2025-03-18 NOTE — TELEPHONE ENCOUNTER
Attempted to contact patient regarding upcoming Medicare wellness appointment and completion of HRA questionnaire. LVM for patient to please return call at [phone].

## 2025-03-19 ENCOUNTER — TELEPHONE (OUTPATIENT)
Facility: CLINIC | Age: 60
End: 2025-03-19

## 2025-03-19 NOTE — TELEPHONE ENCOUNTER
Attempted to contact patient regarding upcoming Medicare wellness appointment and completion of HRA questionnaire. LVM for patient to please return call at [phone].

## 2025-03-20 ENCOUNTER — OFFICE VISIT (OUTPATIENT)
Facility: CLINIC | Age: 60
End: 2025-03-20
Payer: MEDICARE

## 2025-03-20 VITALS
HEIGHT: 72 IN | BODY MASS INDEX: 16.8 KG/M2 | SYSTOLIC BLOOD PRESSURE: 170 MMHG | WEIGHT: 124 LBS | OXYGEN SATURATION: 100 % | HEART RATE: 90 BPM | DIASTOLIC BLOOD PRESSURE: 100 MMHG

## 2025-03-20 DIAGNOSIS — Z13.220 SCREENING CHOLESTEROL LEVEL: ICD-10-CM

## 2025-03-20 DIAGNOSIS — I10 ESSENTIAL HYPERTENSION, BENIGN: ICD-10-CM

## 2025-03-20 DIAGNOSIS — D64.9 MILD ANEMIA: ICD-10-CM

## 2025-03-20 DIAGNOSIS — E55.9 VITAMIN D DEFICIENCY: ICD-10-CM

## 2025-03-20 DIAGNOSIS — J43.8 OTHER EMPHYSEMA (HCC): ICD-10-CM

## 2025-03-20 DIAGNOSIS — R79.89 ABNORMAL LFTS: ICD-10-CM

## 2025-03-20 DIAGNOSIS — G40.802 OTHER EPILEPSY WITHOUT STATUS EPILEPTICUS, NOT INTRACTABLE: ICD-10-CM

## 2025-03-20 DIAGNOSIS — Z12.5 SCREENING PSA (PROSTATE SPECIFIC ANTIGEN): ICD-10-CM

## 2025-03-20 DIAGNOSIS — Z00.00 WELCOME TO MEDICARE PREVENTIVE VISIT: Primary | Chronic | ICD-10-CM

## 2025-03-20 DIAGNOSIS — K86.81 EXOCRINE PANCREATIC INSUFFICIENCY: ICD-10-CM

## 2025-03-20 DIAGNOSIS — F19.10 POLYSUBSTANCE ABUSE: ICD-10-CM

## 2025-03-20 DIAGNOSIS — R94.31 ABNORMAL EKG: ICD-10-CM

## 2025-03-20 PROCEDURE — G8427 DOCREV CUR MEDS BY ELIG CLIN: HCPCS | Performed by: INTERNAL MEDICINE

## 2025-03-20 PROCEDURE — G8419 CALC BMI OUT NRM PARAM NOF/U: HCPCS | Performed by: INTERNAL MEDICINE

## 2025-03-20 PROCEDURE — 3077F SYST BP >= 140 MM HG: CPT | Performed by: INTERNAL MEDICINE

## 2025-03-20 PROCEDURE — 3080F DIAST BP >= 90 MM HG: CPT | Performed by: INTERNAL MEDICINE

## 2025-03-20 PROCEDURE — 3023F SPIROM DOC REV: CPT | Performed by: INTERNAL MEDICINE

## 2025-03-20 PROCEDURE — 4004F PT TOBACCO SCREEN RCVD TLK: CPT | Performed by: INTERNAL MEDICINE

## 2025-03-20 PROCEDURE — 3017F COLORECTAL CA SCREEN DOC REV: CPT | Performed by: INTERNAL MEDICINE

## 2025-03-20 PROCEDURE — 99214 OFFICE O/P EST MOD 30 MIN: CPT | Performed by: INTERNAL MEDICINE

## 2025-03-20 PROCEDURE — G0402 INITIAL PREVENTIVE EXAM: HCPCS | Performed by: INTERNAL MEDICINE

## 2025-03-20 RX ORDER — ONDANSETRON 4 MG/1
4 TABLET, FILM COATED ORAL 3 TIMES DAILY PRN
Qty: 30 TABLET | Refills: 0 | Status: SHIPPED | OUTPATIENT
Start: 2025-03-20

## 2025-03-20 RX ORDER — PANCRELIPASE 60000; 12000; 38000 [USP'U]/1; [USP'U]/1; [USP'U]/1
CAPSULE, DELAYED RELEASE PELLETS ORAL
Qty: 270 CAPSULE | Refills: 1 | Status: SHIPPED | OUTPATIENT
Start: 2025-03-20

## 2025-03-20 RX ORDER — LABETALOL 200 MG/1
200 TABLET, FILM COATED ORAL 2 TIMES DAILY
Qty: 180 TABLET | Refills: 1 | Status: SHIPPED | OUTPATIENT
Start: 2025-03-20

## 2025-03-20 RX ORDER — ZOSTER VACCINE RECOMBINANT, ADJUVANTED 50 MCG/0.5
0.5 KIT INTRAMUSCULAR SEE ADMIN INSTRUCTIONS
Qty: 0.5 ML | Refills: 0 | Status: SHIPPED | OUTPATIENT
Start: 2025-03-20 | End: 2025-09-16

## 2025-03-20 SDOH — ECONOMIC STABILITY: FOOD INSECURITY: WITHIN THE PAST 12 MONTHS, YOU WORRIED THAT YOUR FOOD WOULD RUN OUT BEFORE YOU GOT MONEY TO BUY MORE.: NEVER TRUE

## 2025-03-20 SDOH — ECONOMIC STABILITY: FOOD INSECURITY: WITHIN THE PAST 12 MONTHS, THE FOOD YOU BOUGHT JUST DIDN'T LAST AND YOU DIDN'T HAVE MONEY TO GET MORE.: NEVER TRUE

## 2025-03-20 ASSESSMENT — ENCOUNTER SYMPTOMS
ABDOMINAL PAIN: 0
VOMITING: 0
NAUSEA: 0
SHORTNESS OF BREATH: 0
DIARRHEA: 1
COUGH: 0

## 2025-03-20 ASSESSMENT — PATIENT HEALTH QUESTIONNAIRE - PHQ9
9. THOUGHTS THAT YOU WOULD BE BETTER OFF DEAD, OR OF HURTING YOURSELF: NOT AT ALL
10. IF YOU CHECKED OFF ANY PROBLEMS, HOW DIFFICULT HAVE THESE PROBLEMS MADE IT FOR YOU TO DO YOUR WORK, TAKE CARE OF THINGS AT HOME, OR GET ALONG WITH OTHER PEOPLE: NOT DIFFICULT AT ALL
3. TROUBLE FALLING OR STAYING ASLEEP: NOT AT ALL
2. FEELING DOWN, DEPRESSED OR HOPELESS: NOT AT ALL
7. TROUBLE CONCENTRATING ON THINGS, SUCH AS READING THE NEWSPAPER OR WATCHING TELEVISION: NOT AT ALL
1. LITTLE INTEREST OR PLEASURE IN DOING THINGS: NOT AT ALL
6. FEELING BAD ABOUT YOURSELF - OR THAT YOU ARE A FAILURE OR HAVE LET YOURSELF OR YOUR FAMILY DOWN: NOT AT ALL
SUM OF ALL RESPONSES TO PHQ QUESTIONS 1-9: 0
4. FEELING TIRED OR HAVING LITTLE ENERGY: NOT AT ALL
5. POOR APPETITE OR OVEREATING: NOT AT ALL
SUM OF ALL RESPONSES TO PHQ QUESTIONS 1-9: 0
8. MOVING OR SPEAKING SO SLOWLY THAT OTHER PEOPLE COULD HAVE NOTICED. OR THE OPPOSITE, BEING SO FIGETY OR RESTLESS THAT YOU HAVE BEEN MOVING AROUND A LOT MORE THAN USUAL: NOT AT ALL
SUM OF ALL RESPONSES TO PHQ QUESTIONS 1-9: 0
SUM OF ALL RESPONSES TO PHQ QUESTIONS 1-9: 0

## 2025-03-20 ASSESSMENT — LIFESTYLE VARIABLES
HOW OFTEN DURING THE LAST YEAR HAVE YOU NEEDED AN ALCOHOLIC DRINK FIRST THING IN THE MORNING TO GET YOURSELF GOING AFTER A NIGHT OF HEAVY DRINKING: NEVER
HAVE YOU OR SOMEONE ELSE BEEN INJURED AS A RESULT OF YOUR DRINKING: NO
HOW OFTEN DURING THE LAST YEAR HAVE YOU FOUND THAT YOU WERE NOT ABLE TO STOP DRINKING ONCE YOU HAD STARTED: NEVER
HAS A RELATIVE, FRIEND, DOCTOR, OR ANOTHER HEALTH PROFESSIONAL EXPRESSED CONCERN ABOUT YOUR DRINKING OR SUGGESTED YOU CUT DOWN: NO
HOW OFTEN DURING THE LAST YEAR HAVE YOU BEEN UNABLE TO REMEMBER WHAT HAPPENED THE NIGHT BEFORE BECAUSE YOU HAD BEEN DRINKING: NEVER
HOW MANY STANDARD DRINKS CONTAINING ALCOHOL DO YOU HAVE ON A TYPICAL DAY: 1 OR 2
HOW OFTEN DURING THE LAST YEAR HAVE YOU FAILED TO DO WHAT WAS NORMALLY EXPECTED FROM YOU BECAUSE OF DRINKING: NEVER
HOW OFTEN DURING THE LAST YEAR HAVE YOU HAD A FEELING OF GUILT OR REMORSE AFTER DRINKING: NEVER
HOW OFTEN DO YOU HAVE A DRINK CONTAINING ALCOHOL: 4 OR MORE TIMES A WEEK

## 2025-03-20 ASSESSMENT — VISUAL ACUITY
OD_CC: 20/30
OS_CC: 20/30

## 2025-03-20 ASSESSMENT — ANXIETY QUESTIONNAIRES
3. WORRYING TOO MUCH ABOUT DIFFERENT THINGS: NOT AT ALL
7. FEELING AFRAID AS IF SOMETHING AWFUL MIGHT HAPPEN: NOT AT ALL
GAD7 TOTAL SCORE: 0
4. TROUBLE RELAXING: NOT AT ALL
2. NOT BEING ABLE TO STOP OR CONTROL WORRYING: NOT AT ALL
6. BECOMING EASILY ANNOYED OR IRRITABLE: NOT AT ALL
IF YOU CHECKED OFF ANY PROBLEMS ON THIS QUESTIONNAIRE, HOW DIFFICULT HAVE THESE PROBLEMS MADE IT FOR YOU TO DO YOUR WORK, TAKE CARE OF THINGS AT HOME, OR GET ALONG WITH OTHER PEOPLE: NOT DIFFICULT AT ALL
5. BEING SO RESTLESS THAT IT IS HARD TO SIT STILL: NOT AT ALL
1. FEELING NERVOUS, ANXIOUS, OR ON EDGE: NOT AT ALL

## 2025-03-20 NOTE — PROGRESS NOTES
.  Chief Complaint   Patient presents with    Medicare AWV   .  \"Have you been to the ER, urgent care clinic since your last visit?  Hospitalized since your last visit?\"    NO    “Have you seen or consulted any other health care providers outside our system since your last visit?”    NO    .BP (!) 176/109 (BP Site: Left Upper Arm, Patient Position: Sitting, BP Cuff Size: Medium Adult)   Pulse 87   Ht 1.829 m (6')   Wt 56.2 kg (124 lb)   SpO2 100%   BMI 16.82 kg/m²         
taste buds will adjust to less salt.     Eat fewer snack items, fast foods, canned soups, and other high-salt, high-fat, processed foods.     Read food labels and try to avoid saturated and trans fats. They increase your risk of heart disease by raising cholesterol levels.     Limit the amount of solid fat--butter, margarine, and shortening--you eat. Use olive, peanut, or canola oil when you cook. Bake, broil, and steam foods instead of frying them.     Eat a variety of fruit and vegetables every day. Dark green, deep orange, red, or yellow fruits and vegetables are especially good for you. Examples include spinach, carrots, peaches, and berries.     Foods high in fiber can reduce your cholesterol and provide important vitamins and minerals. High-fiber foods include whole-grain cereals and breads, oatmeal, beans, brown rice, citrus fruits, and apples.     Eat lean proteins. Heart-healthy proteins include seafood, lean meats and poultry, eggs, beans, peas, nuts, seeds, and soy products.     Limit drinks and foods with added sugar. These include candy, desserts, and soda pop.   Heart-healthy lifestyle    If your doctor recommends it, get more exercise. For many people, walking is a good choice. Or you may want to swim, bike, or do other activities. Bit by bit, increase the time you're active every day. Try for at least 30 minutes on most days of the week.     Try to quit or cut back on using tobacco and other nicotine products. This includes smoking and vaping. If you need help quitting, talk to your doctor about stop-smoking programs and medicines. These can increase your chances of quitting for good. Quitting is one of the most important things you can do to protect your heart. It is never too late to quit. Try to avoid secondhand smoke too.     Stay at a weight that's healthy for you. Talk to your doctor if you need help losing weight.     Try to get 7 to 9 hours of sleep each night.     Limit alcohol to 2 drinks a

## 2025-03-21 NOTE — PATIENT INSTRUCTIONS
Instructions.\"  Current as of: July 31, 2024  Content Version: 14.4  © 3698-2292 [employer].   Care instructions adapted under license by [employer]. If you have questions about a medical condition or this instruction, always ask your healthcare professional. [employer] disclaims any warranty or liability for your use of this information.    Personalized Preventive Plan for Star Noyola - 3/20/2025  Medicare offers a range of preventive health benefits. Some of the tests and screenings are paid in full while other may be subject to a deductible, co-insurance, and/or copay.  Some of these benefits include a comprehensive review of your medical history including lifestyle, illnesses that may run in your family, and various assessments and screenings as appropriate.  After reviewing your medical record and screening and assessments performed today your provider may have ordered immunizations, labs, imaging, and/or referrals for you.  A list of these orders (if applicable) as well as your Preventive Care list are included within your After Visit Summary for your review.

## 2025-03-24 ENCOUNTER — TELEPHONE (OUTPATIENT)
Facility: CLINIC | Age: 60
End: 2025-03-24

## 2025-03-24 DIAGNOSIS — K86.81 EXOCRINE PANCREATIC INSUFFICIENCY: ICD-10-CM

## 2025-03-24 RX ORDER — PANCRELIPASE 60000; 12000; 38000 [USP'U]/1; [USP'U]/1; [USP'U]/1
12000 CAPSULE, DELAYED RELEASE PELLETS ORAL
Qty: 270 CAPSULE | Refills: 1 | Status: SHIPPED | OUTPATIENT
Start: 2025-03-24

## 2025-03-24 NOTE — TELEPHONE ENCOUNTER
Received call from  Pharmacy on the Creon. It only says take 3 times daily. They need to know how many capsules.

## 2025-04-01 DIAGNOSIS — K86.81 EXOCRINE PANCREATIC INSUFFICIENCY: ICD-10-CM

## 2025-04-04 DIAGNOSIS — R39.12 BENIGN PROSTATIC HYPERPLASIA WITH WEAK URINARY STREAM: ICD-10-CM

## 2025-04-04 DIAGNOSIS — N40.1 BENIGN PROSTATIC HYPERPLASIA WITH WEAK URINARY STREAM: ICD-10-CM

## 2025-04-04 DIAGNOSIS — N52.9 ERECTILE DYSFUNCTION, UNSPECIFIED ERECTILE DYSFUNCTION TYPE: ICD-10-CM

## 2025-04-04 RX ORDER — TADALAFIL 20 MG/1
TABLET ORAL
Qty: 10 TABLET | Refills: 2 | Status: SHIPPED | OUTPATIENT
Start: 2025-04-04

## 2025-05-01 ENCOUNTER — TELEPHONE (OUTPATIENT)
Facility: CLINIC | Age: 60
End: 2025-05-01

## 2025-05-01 NOTE — TELEPHONE ENCOUNTER
TC from Mr. Noyola    Patient states he went to the pharmacy and paid $60 for a 3 month supply of his Labetalol and has never had to pay for this medication    He was told to contact the pharmacy make sure they both of his insurance cards and call his insurance too to make this medication should be 100% covered.      Mariann Celaya LPN

## 2025-06-15 DIAGNOSIS — R39.12 BENIGN PROSTATIC HYPERPLASIA WITH WEAK URINARY STREAM: ICD-10-CM

## 2025-06-15 DIAGNOSIS — N40.1 BENIGN PROSTATIC HYPERPLASIA WITH WEAK URINARY STREAM: ICD-10-CM

## 2025-06-15 DIAGNOSIS — N52.9 ERECTILE DYSFUNCTION, UNSPECIFIED ERECTILE DYSFUNCTION TYPE: ICD-10-CM

## 2025-06-16 RX ORDER — TADALAFIL 20 MG/1
TABLET ORAL
Qty: 10 TABLET | Refills: 2 | Status: SHIPPED | OUTPATIENT
Start: 2025-06-16

## 2025-06-20 ENCOUNTER — OFFICE VISIT (OUTPATIENT)
Facility: CLINIC | Age: 60
End: 2025-06-20
Payer: MEDICARE

## 2025-06-20 VITALS
BODY MASS INDEX: 19.5 KG/M2 | HEIGHT: 72 IN | HEART RATE: 91 BPM | DIASTOLIC BLOOD PRESSURE: 60 MMHG | OXYGEN SATURATION: 97 % | WEIGHT: 144 LBS | SYSTOLIC BLOOD PRESSURE: 94 MMHG

## 2025-06-20 DIAGNOSIS — M54.50 LUMBAR PAIN: Primary | ICD-10-CM

## 2025-06-20 DIAGNOSIS — I10 ESSENTIAL HYPERTENSION, BENIGN: ICD-10-CM

## 2025-06-20 DIAGNOSIS — D64.9 MILD ANEMIA: ICD-10-CM

## 2025-06-20 DIAGNOSIS — F10.90 ALCOHOL USE: ICD-10-CM

## 2025-06-20 DIAGNOSIS — R79.89 ABNORMAL LFTS: ICD-10-CM
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
## 2025-06-20 PROCEDURE — 3074F SYST BP LT 130 MM HG: CPT | Performed by: INTERNAL MEDICINE

## 2025-06-20 PROCEDURE — 4004F PT TOBACCO SCREEN RCVD TLK: CPT | Performed by: INTERNAL MEDICINE

## 2025-06-20 PROCEDURE — 99214 OFFICE O/P EST MOD 30 MIN: CPT | Performed by: INTERNAL MEDICINE

## 2025-06-20 PROCEDURE — G8427 DOCREV CUR MEDS BY ELIG CLIN: HCPCS | Performed by: INTERNAL MEDICINE

## 2025-06-20 PROCEDURE — 3078F DIAST BP <80 MM HG: CPT | Performed by: INTERNAL MEDICINE

## 2025-06-20 PROCEDURE — 3017F COLORECTAL CA SCREEN DOC REV: CPT | Performed by: INTERNAL MEDICINE

## 2025-06-20 PROCEDURE — G8420 CALC BMI NORM PARAMETERS: HCPCS | Performed by: INTERNAL MEDICINE

## 2025-06-20 RX ORDER — LABETALOL 100 MG/1
100 TABLET, FILM COATED ORAL 2 TIMES DAILY
Qty: 180 TABLET | Refills: 0 | Status: SHIPPED | OUTPATIENT
Start: 2025-06-20

## 2025-06-20 ASSESSMENT — ENCOUNTER SYMPTOMS
ABDOMINAL PAIN: 0
BACK PAIN: 1
NAUSEA: 0
VOMITING: 0
COUGH: 0
DIARRHEA: 0
SHORTNESS OF BREATH: 0

## 2025-06-20 NOTE — PROGRESS NOTES
[facility]  [address], [place] [address]  Phone: [phone]      Star Noyola (: 1965) is a 60 y.o. male, established patient, here for evaluation of the following chief complaint(s):  Follow-up and Back Pain (Bruises on back from fall. )     SUBJECTIVE/OBJECTIVE:  History of Present Illness  The patient is a 60-year-old male with a past medical history of hypertension, chronic pancreatic insufficiency, polysubstance use disorder, anemia, emphysema, abnormal liver function tests (LFTs), seizure disorder, and vitamin D deficiency. He is seen today for a follow-up on back pain and a fall that resulted in a bruise on his back.    Earlier this week, either on Monday or Tuesday, he experienced a fall when he was knocked down by a dog while entering his father in law's house. This incident resulted in bruising on his back and side. He did not seek immediate medical attention but has been soaking the affected area. He does not have an ice pack at home but does possess a heating pad. He reports no head injury from the fall.    He continues to consume alcohol daily and does not monitor his blood pressure at home. He is currently on labetalol 200 mg twice daily for hypertension. He is also taking iron supplements due to a previous diagnosis of low iron levels. He is under the care of Dr. Loja for seizure management and is on Keppra. He has not been using Creon for his diarrhea.    SOCIAL HISTORY  - Drinks alcohol every day      Results  Labs   - Blood sugar: 2024, Slightly low   - Liver enzyme: 2024, Slightly high   - Thyroid function: 2024, Normal     No results found for: \"LABA1C\"   Hemoglobin   Date Value Ref Range Status   10/17/2024 13.6 13.0 - 17.7 g/dL Final     Hematocrit   Date Value Ref Range Status   10/17/2024 40.1 37.5 - 51.0 % Final     Creatinine   Date Value Ref Range Status   2024 0.71 (L) 0.76 - 1.27 mg/dL Final     Glucose

## 2025-06-20 NOTE — PROGRESS NOTES
.  Chief Complaint   Patient presents with    Follow-up    Back Pain     Bruises on back from fall.        Have you been to the ER, urgent care clinic since your last visit?  Hospitalized since your last visit?   NO    Have you seen or consulted any other health care providers outside our system since your last visit?   YES - When: approximately 1 months ago.  Where and Why: [facility].    .BP 94/60 (BP Site: Right Upper Arm, Patient Position: Sitting, BP Cuff Size: Medium Adult)   Pulse 91   Ht 1.829 m (6')   Wt 65.3 kg (144 lb)   SpO2 97%   BMI 19.53 kg/m²

## 2025-07-21 RX ORDER — FERROUS SULFATE 325(65) MG
1 TABLET ORAL 2 TIMES DAILY
Qty: 180 TABLET | Refills: 1 | Status: SHIPPED | OUTPATIENT
Start: 2025-07-21

## 2025-08-26 DIAGNOSIS — N52.9 ERECTILE DYSFUNCTION, UNSPECIFIED ERECTILE DYSFUNCTION TYPE: ICD-10-CM

## 2025-08-26 DIAGNOSIS — R39.12 BENIGN PROSTATIC HYPERPLASIA WITH WEAK URINARY STREAM: ICD-10-CM

## 2025-08-26 DIAGNOSIS — N40.1 BENIGN PROSTATIC HYPERPLASIA WITH WEAK URINARY STREAM: ICD-10-CM

## 2025-08-28 RX ORDER — TADALAFIL 20 MG/1
10 TABLET ORAL PRN
Qty: 10 TABLET | Refills: 2 | Status: SHIPPED | OUTPATIENT
Start: 2025-08-28

## 2025-08-28 RX ORDER — LABETALOL 100 MG/1
100 TABLET, FILM COATED ORAL 2 TIMES DAILY
Qty: 180 TABLET | Refills: 0 | Status: SHIPPED | OUTPATIENT
Start: 2025-08-28

## 2025-08-28 RX ORDER — MULTIVITAMIN
1 TABLET ORAL DAILY
Qty: 30 TABLET | Refills: 5 | Status: SHIPPED | OUTPATIENT
Start: 2025-08-28

## (undated) DEVICE — THE ENDO CARRY-ON PROCEDURE KIT CONTAINS ALL OF THE SUPPLIES AND INFECTION PREVENTION PRODUCTS NEEDED FOR ENDOSCOPIC PROCEDURES: Brand: ENDO CARRY-ON PROCEDURE KIT

## (undated) DEVICE — FORCEPS BX L240CM JAW DIA2.4MM ORNG L CAP W/ NDL DISP RAD

## (undated) DEVICE — MASK ANES INF SZ 2 PREM TAIL VLV INFL PRT UNSCENTED SGL PT